# Patient Record
Sex: MALE | Race: WHITE | HISPANIC OR LATINO | Employment: FULL TIME | URBAN - METROPOLITAN AREA
[De-identification: names, ages, dates, MRNs, and addresses within clinical notes are randomized per-mention and may not be internally consistent; named-entity substitution may affect disease eponyms.]

---

## 2023-06-27 PROBLEM — R74.01 TRANSAMINITIS: Status: ACTIVE | Noted: 2023-06-27

## 2023-06-27 PROBLEM — K92.1 HEMATOCHEZIA: Status: ACTIVE | Noted: 2023-06-27

## 2023-06-27 PROBLEM — K52.9 COLITIS: Status: ACTIVE | Noted: 2023-06-27

## 2023-06-28 PROBLEM — D64.9 ANEMIA: Status: ACTIVE | Noted: 2023-06-28

## 2023-07-21 ENCOUNTER — HOSPITAL ENCOUNTER (EMERGENCY)
Facility: HOSPITAL | Age: 33
Discharge: HOME | End: 2023-07-21
Attending: EMERGENCY MEDICINE | Admitting: EMERGENCY MEDICINE
Payer: COMMERCIAL

## 2023-07-21 VITALS
SYSTOLIC BLOOD PRESSURE: 118 MMHG | RESPIRATION RATE: 16 BRPM | OXYGEN SATURATION: 96 % | HEIGHT: 66 IN | BODY MASS INDEX: 21.69 KG/M2 | DIASTOLIC BLOOD PRESSURE: 55 MMHG | HEART RATE: 83 BPM | WEIGHT: 135 LBS | TEMPERATURE: 98.7 F

## 2023-07-21 DIAGNOSIS — K52.9 INFLAMMATORY BOWEL DISEASE: ICD-10-CM

## 2023-07-21 DIAGNOSIS — K62.5 RECTAL BLEEDING: Primary | ICD-10-CM

## 2023-07-21 LAB
ABO GROUP BLD: NORMAL
ALBUMIN SERPL-MCNC: 4.2 G/DL (ref 3.5–5.2)
ALBUMIN/GLOB SERPL: 2 G/DL
ALP SERPL-CCNC: 71 U/L (ref 39–117)
ALT SERPL W P-5'-P-CCNC: 54 U/L (ref 1–41)
ANION GAP SERPL CALCULATED.3IONS-SCNC: 9 MMOL/L (ref 5–15)
AST SERPL-CCNC: 25 U/L (ref 1–40)
BILIRUB SERPL-MCNC: 0.4 MG/DL (ref 0–1.2)
BLD GP AB SCN SERPL QL: NEGATIVE
BUN SERPL-MCNC: 10 MG/DL (ref 6–20)
BUN/CREAT SERPL: 14.3 (ref 7–25)
CALCIUM SPEC-SCNC: 9.3 MG/DL (ref 8.6–10.5)
CHLORIDE SERPL-SCNC: 105 MMOL/L (ref 98–107)
CO2 SERPL-SCNC: 24 MMOL/L (ref 22–29)
CREAT SERPL-MCNC: 0.7 MG/DL (ref 0.76–1.27)
CRP SERPL-MCNC: <0.3 MG/DL (ref 0–0.5)
DEPRECATED RDW RBC AUTO: 49.4 FL (ref 37–54)
EGFRCR SERPLBLD CKD-EPI 2021: 126.3 ML/MIN/1.73
EOSINOPHIL # BLD MANUAL: 0.38 10*3/MM3 (ref 0–0.4)
EOSINOPHIL NFR BLD MANUAL: 7.3 % (ref 0.3–6.2)
ERYTHROCYTE [DISTWIDTH] IN BLOOD BY AUTOMATED COUNT: 14.6 % (ref 12.3–15.4)
ERYTHROCYTE [SEDIMENTATION RATE] IN BLOOD: 9 MM/HR (ref 0–15)
GLOBULIN UR ELPH-MCNC: 2.1 GM/DL
GLUCOSE SERPL-MCNC: 98 MG/DL (ref 65–99)
HCT VFR BLD AUTO: 38.2 % (ref 37.5–51)
HGB BLD-MCNC: 13 G/DL (ref 13–17.7)
LYMPHOCYTES # BLD MANUAL: 1.35 10*3/MM3 (ref 0.7–3.1)
LYMPHOCYTES NFR BLD MANUAL: 15.6 % (ref 5–12)
MCH RBC QN AUTO: 31.8 PG (ref 26.6–33)
MCHC RBC AUTO-ENTMCNC: 34 G/DL (ref 31.5–35.7)
MCV RBC AUTO: 93.4 FL (ref 79–97)
MONOCYTES # BLD: 0.81 10*3/MM3 (ref 0.1–0.9)
NEUTROPHILS # BLD AUTO: 2.65 10*3/MM3 (ref 1.7–7)
NEUTROPHILS NFR BLD MANUAL: 51 % (ref 42.7–76)
PLAT MORPH BLD: NORMAL
PLATELET # BLD AUTO: 198 10*3/MM3 (ref 140–450)
PMV BLD AUTO: 8.9 FL (ref 6–12)
POTASSIUM SERPL-SCNC: 4.2 MMOL/L (ref 3.5–5.2)
PROT SERPL-MCNC: 6.3 G/DL (ref 6–8.5)
RBC # BLD AUTO: 4.09 10*6/MM3 (ref 4.14–5.8)
RBC MORPH BLD: NORMAL
RH BLD: POSITIVE
SMUDGE CELLS BLD QL SMEAR: ABNORMAL
SODIUM SERPL-SCNC: 138 MMOL/L (ref 136–145)
T&S EXPIRATION DATE: NORMAL
VARIANT LYMPHS NFR BLD MANUAL: 26 % (ref 19.6–45.3)
WBC NRBC COR # BLD: 5.2 10*3/MM3 (ref 3.4–10.8)

## 2023-07-21 PROCEDURE — 85652 RBC SED RATE AUTOMATED: CPT | Performed by: EMERGENCY MEDICINE

## 2023-07-21 PROCEDURE — 86140 C-REACTIVE PROTEIN: CPT | Performed by: EMERGENCY MEDICINE

## 2023-07-21 PROCEDURE — 80053 COMPREHEN METABOLIC PANEL: CPT | Performed by: EMERGENCY MEDICINE

## 2023-07-21 PROCEDURE — 86900 BLOOD TYPING SEROLOGIC ABO: CPT | Performed by: EMERGENCY MEDICINE

## 2023-07-21 PROCEDURE — 85025 COMPLETE CBC W/AUTO DIFF WBC: CPT | Performed by: EMERGENCY MEDICINE

## 2023-07-21 PROCEDURE — 99283 EMERGENCY DEPT VISIT LOW MDM: CPT

## 2023-07-21 PROCEDURE — 86901 BLOOD TYPING SEROLOGIC RH(D): CPT | Performed by: EMERGENCY MEDICINE

## 2023-07-21 PROCEDURE — 86850 RBC ANTIBODY SCREEN: CPT | Performed by: EMERGENCY MEDICINE

## 2023-07-21 PROCEDURE — 85007 BL SMEAR W/DIFF WBC COUNT: CPT | Performed by: EMERGENCY MEDICINE

## 2023-07-21 RX ORDER — SODIUM CHLORIDE 0.9 % (FLUSH) 0.9 %
10 SYRINGE (ML) INJECTION AS NEEDED
Status: DISCONTINUED | OUTPATIENT
Start: 2023-07-21 | End: 2023-07-21 | Stop reason: HOSPADM

## 2023-07-21 RX ORDER — PREDNISONE 20 MG/1
20 TABLET ORAL DAILY
Qty: 30 TABLET | Refills: 0 | Status: SHIPPED | OUTPATIENT
Start: 2023-07-21 | End: 2023-07-27 | Stop reason: DRUGHIGH

## 2023-07-21 RX ORDER — MESALAMINE 4 G/60ML
4 ENEMA RECTAL NIGHTLY
Qty: 1800 ML | Refills: 0 | Status: SHIPPED | OUTPATIENT
Start: 2023-07-21 | End: 2023-08-20

## 2023-07-21 NOTE — ED PROVIDER NOTES
EMERGENCY DEPARTMENT ENCOUNTER    Room Number:  11/11  PCP: Provider, No Known  Patient Care Team:  Provider, No Known as PCP - General   Independent Historians: Patient, family    HPI:  Chief Complaint: Rectal bleeding    A complete HPI/ROS/PMH/PSH/SH/FH are unobtainable due to: Language barrier    Chronic or social conditions impacting patient care (Social Determinants of Health): Nothing  (Financial Resource Strain / Food Insecurity / Transportation Needs / Physical Activity / Stress / Social Connections / Intimate Partner Violence / Housing Stability)    Context: Magdy Guerrero is a 33 y.o. male who presents to the ED c/o acute rectal bleeding.  The patient reports that he was recently admitted to the hospital for rectal bleeding.  He states it started 2 days ago.  He states he is on prednisone.  He states that he was recently admitted to the hospital for colitis and was told that he had inflammatory bowel disease.  He states he has not followed up with the GI doctors as an outpatient.  He denies any pain.  He reports he only has blood in his stool with bowel movement.  He denies any chest pain or shortness of breath.  He denies any dizziness.    Review of prior external notes (non-ED) -and- Review of prior external test results outside of this encounter: Discharge summary dated 7/2/2023 noting inflammatory bowel disease with hematochezia.  He had upper and lower GI scopes.    Prescription drug monitoring program review:         PAST MEDICAL HISTORY  Active Ambulatory Problems     Diagnosis Date Noted    Colitis 06/27/2023    Hematochezia 06/27/2023    Transaminitis 06/27/2023    Anemia 06/28/2023     Resolved Ambulatory Problems     Diagnosis Date Noted    No Resolved Ambulatory Problems     No Additional Past Medical History         PAST SURGICAL HISTORY  Past Surgical History:   Procedure Laterality Date    COLONOSCOPY N/A 6/29/2023    Procedure: COLONOSCOPY TO CECUM/TI WITH COLD BIOPSIES THROUGHOUT;   Surgeon: Jovanni Carrera MD;  Location: [facility] ENDOSCOPY;  Service: Gastroenterology;  Laterality: N/A;  pre: HEMATOCHEZIA  post: PAN -COLITIS    ENDOSCOPY N/A 6/29/2023    Procedure: ESOPHAGOGASTRODUODENOSCOPY WITH BIOPSIES;  Surgeon: Jovanni Carrera MD;  Location: [facility] ENDOSCOPY;  Service: Gastroenterology;  Laterality: N/A;  pre: MELENA  post: MILD ESOPHAGITIS, GASTRITIS         FAMILY HISTORY  No family history on file.      SOCIAL HISTORY  Social History     Socioeconomic History    Marital status: Single   Tobacco Use    Smoking status: Former     Types: Cigarettes     Quit date: 2020     Years since quitting: 3.5    Smokeless tobacco: Never   Vaping Use    Vaping Use: Never used   Substance and Sexual Activity    Alcohol use: Never    Drug use: Yes     Types: Marijuana     Comment: once a day    Sexual activity: Defer         ALLERGIES  Patient has no known allergies.        REVIEW OF SYSTEMS  Review of Systems  Included in HPI  All systems reviewed and negative except for those discussed in HPI.      PHYSICAL EXAM    I have reviewed the triage vital signs and nursing notes.    ED Triage Vitals   Temp Heart Rate Resp BP SpO2   07/21/23 0935 07/21/23 0935 07/21/23 0935 07/21/23 0947 07/21/23 0935   98.7 °F (37.1 °C) 96 16 134/77 98 %      Temp src Heart Rate Source Patient Position BP Location FiO2 (%)   -- -- -- -- --              Physical Exam  GENERAL: Awake, alert, no acute distress, well-appearing  SKIN: Warm, dry  HENT: Normocephalic, atraumatic  EYES: no scleral icterus  CV: regular rhythm, regular rate  RESPIRATORY: normal effort, lungs clear  ABDOMEN: soft, nontender, nondistended  MUSCULOSKELETAL: no deformity  NEURO: alert, moves all extremities, follows commands                                                               LAB RESULTS  Recent Results (from the past 24 hour(s))   Sedimentation Rate    Collection Time: 07/21/23 10:02 AM    Specimen: Blood   Result Value Ref Range    Sed  Rate 9 0 - 15 mm/hr   C-reactive Protein    Collection Time: 07/21/23 10:02 AM    Specimen: Blood   Result Value Ref Range    C-Reactive Protein <0.30 0.00 - 0.50 mg/dL   Type & Screen    Collection Time: 07/21/23 10:02 AM    Specimen: Blood   Result Value Ref Range    ABO Type O     RH type Positive     Antibody Screen Negative     T&S Expiration Date 7/24/2023 11:59:59 PM    CBC Auto Differential    Collection Time: 07/21/23 10:02 AM    Specimen: Blood   Result Value Ref Range    WBC 5.20 3.40 - 10.80 10*3/mm3    RBC 4.09 (L) 4.14 - 5.80 10*6/mm3    Hemoglobin 13.0 13.0 - 17.7 g/dL    Hematocrit 38.2 37.5 - 51.0 %    MCV 93.4 79.0 - 97.0 fL    MCH 31.8 26.6 - 33.0 pg    MCHC 34.0 31.5 - 35.7 g/dL    RDW 14.6 12.3 - 15.4 %    RDW-SD 49.4 37.0 - 54.0 fl    MPV 8.9 6.0 - 12.0 fL    Platelets 198 140 - 450 10*3/mm3   Manual Differential    Collection Time: 07/21/23 10:02 AM    Specimen: Blood   Result Value Ref Range    Neutrophil % 51.0 42.7 - 76.0 %    Lymphocyte % 26.0 19.6 - 45.3 %    Monocyte % 15.6 (H) 5.0 - 12.0 %    Eosinophil % 7.3 (H) 0.3 - 6.2 %    Neutrophils Absolute 2.65 1.70 - 7.00 10*3/mm3    Lymphocytes Absolute 1.35 0.70 - 3.10 10*3/mm3    Monocytes Absolute 0.81 0.10 - 0.90 10*3/mm3    Eosinophils Absolute 0.38 0.00 - 0.40 10*3/mm3    RBC Morphology Normal Normal    Smudge Cells Slight/1+ None Seen    Platelet Morphology Normal Normal   Comprehensive Metabolic Panel    Collection Time: 07/21/23 10:50 AM    Specimen: Blood   Result Value Ref Range    Glucose 98 65 - 99 mg/dL    BUN 10 6 - 20 mg/dL    Creatinine 0.70 (L) 0.76 - 1.27 mg/dL    Sodium 138 136 - 145 mmol/L    Potassium 4.2 3.5 - 5.2 mmol/L    Chloride 105 98 - 107 mmol/L    CO2 24.0 22.0 - 29.0 mmol/L    Calcium 9.3 8.6 - 10.5 mg/dL    Total Protein 6.3 6.0 - 8.5 g/dL    Albumin 4.2 3.5 - 5.2 g/dL    ALT (SGPT) 54 (H) 1 - 41 U/L    AST (SGOT) 25 1 - 40 U/L    Alkaline Phosphatase 71 39 - 117 U/L    Total Bilirubin 0.4 0.0 - 1.2 mg/dL     Globulin 2.1 gm/dL    A/G Ratio 2.0 g/dL    BUN/Creatinine Ratio 14.3 7.0 - 25.0    Anion Gap 9.0 5.0 - 15.0 mmol/L    eGFR 126.3 >60.0 mL/min/1.73       Ordered the above labs and independently reviewed the results.        RADIOLOGY  No Radiology Exams Resulted Within Past 24 Hours    I ordered the above noted radiological studies. Reviewed by me and discussed with radiologist.  See dictation for official radiology interpretation.      PROCEDURES    Procedures      MEDICATIONS GIVEN IN ER    Medications   sodium chloride 0.9 % flush 10 mL (has no administration in time range)         ORDERS PLACED DURING THIS VISIT:  Orders Placed This Encounter   Procedures    Comprehensive Metabolic Panel    Sedimentation Rate    C-reactive Protein    CBC Auto Differential    Manual Differential    Ambulatory Referral to [facility]    Monitor Blood Pressure    Pulse Oximetry, Continuous    Gastroenterology (on-call MD unless specified)    Type & Screen    Insert Peripheral IV    CBC & Differential         PROGRESS, DATA ANALYSIS, CONSULTS, AND MEDICAL DECISION MAKING    All labs have been independently interpreted by me.  All radiology studies have been reviewed by me and discussed with radiologist dictating the report.   EKG's independently viewed and interpreted by me.  Discussion below represents my analysis of pertinent findings related to patient's condition, differential diagnosis, treatment plan and final disposition.    Differential diagnosis includes but is not limited to upper GI bleed, lower GI bleed, infectious colitis, inflammatory colitis.    ED Course as of 07/21/23 1223   Fri Jul 21, 2023   1132 I reviewed work-up and findings with the patient and family at bedside.  Answered all questions.  The patient's hemoglobin is stable.  Rectal exam shows empty vault with no gross blood.  The residue is heme positive.  I have a call out to his GI specialist to discuss.  Of note, he is supposed to follow-up with his GI  specialist 2 weeks after discharge from the hospital but he has no pending appointments in our system. [TR]   1136 I did confirm with the patient and family.  He is currently on 40 mg of prednisone daily.  His dose has not changed. [TR]   1201 Discussing with Dr. Bonilla with gastroenterology.  He requests the patient be prescribed mesalamine enemas nightly.  He was to continue the steroids.  He will send a message to ensure that the patient has follow-up.  He states that the biopsies are consistent with inflammatory bowel disease. [TR]   1222 I reviewed the work-up and findings with the patient and family at the bedside.  Answered all questions.  Plan discharge home.  They do note that the patient only has about a week left of his prednisone.  I will prescribe more to get him coverage until he can see the GI specialist. [TR]      ED Course User Index  [TR] Georgi Walls MD           AS OF 12:23 EDT VITALS:    BP - 118/55  HR - 83  TEMP - 98.7 °F (37.1 °C)  O2 SATS - 96%        DIAGNOSIS  Final diagnoses:   Rectal bleeding   Inflammatory bowel disease         DISPOSITION  ED Disposition       ED Disposition   Discharge    Condition   Stable    Comment   --                  Note Disclaimer: At [facility], we believe that sharing information builds trust and better relationships. You are receiving this note because you recently visited [facility]. It is possible you will see health information before a provider has talked with you about it. This kind of information can be easy to misunderstand. To help you fully understand what it means for your health, we urge you to discuss this note with your provider.         Georgi Walls MD  07/21/23 1212       Georgi Walls MD  07/21/23 1223

## 2023-07-21 NOTE — DISCHARGE INSTRUCTIONS
Use the enema nightly.  Continue to take your steroids.  Return here for fever over 100.4, uncontrolled nausea or vomiting, or uncontrolled bleeding.  The primary care doctor office will call you to schedule an appointment to be seen.  Follow-up with Dr. Carrera.  You will need to call to make an appointment.  He is the bowel specialist.

## 2023-07-27 ENCOUNTER — TELEPHONE (OUTPATIENT)
Dept: GASTROENTEROLOGY | Facility: CLINIC | Age: 33
End: 2023-07-27
Payer: COMMERCIAL

## 2023-07-27 ENCOUNTER — OFFICE VISIT (OUTPATIENT)
Dept: GASTROENTEROLOGY | Facility: CLINIC | Age: 33
End: 2023-07-27
Payer: COMMERCIAL

## 2023-07-27 VITALS
TEMPERATURE: 98.4 F | WEIGHT: 140.4 LBS | HEIGHT: 66 IN | DIASTOLIC BLOOD PRESSURE: 81 MMHG | HEART RATE: 77 BPM | OXYGEN SATURATION: 97 % | BODY MASS INDEX: 22.56 KG/M2 | SYSTOLIC BLOOD PRESSURE: 135 MMHG

## 2023-07-27 DIAGNOSIS — K92.1 HEMATOCHEZIA: ICD-10-CM

## 2023-07-27 DIAGNOSIS — K51.011 ULCERATIVE PANCOLITIS WITH RECTAL BLEEDING: Primary | ICD-10-CM

## 2023-07-27 RX ORDER — PREDNISONE 10 MG/1
TABLET ORAL
Qty: 98 TABLET | Refills: 0 | Status: SHIPPED | OUTPATIENT
Start: 2023-07-27 | End: 2023-08-31

## 2023-07-27 NOTE — TELEPHONE ENCOUNTER
----- Message from Jovanni VALLADARES MD sent at 7/24/2023  1:20 PM EDT -----  Regarding: IBD serology testing  Okay to notify patient IBD serology test was positive, he needs office follow-up to discuss options for treatment.  ----- Message -----  From: Lab, Background User  Sent: 7/20/2023   4:36 AM EDT  To: Jovanni VALLADARES MD

## 2023-07-27 NOTE — PROGRESS NOTES
"Chief Complaint  Black or Bloody Stool and Abdominal Pain    Subjective          History of Present Illness    Magdy Guerrero is a  33 y.o. male presents for hospital follow-up on new diagnosis of ulcerative colitis.  He will follow with myself and Dr. Carrera.    He was admitted to the hospital with acute rectal bleeding and found to have ulcerative pancolitis on colonoscopy.  He was started on prednisone and nightly mesalamine enemas.  Failed oral mesalamine while at the hospital.  IBD serology test was positive. 7/21 labs showed normalization of CBC with hemoglobin 13, CRP and sed rate also normal.  6/27 fecal calprotectin elevated at 1500.  He is down to prednisone 20mg QD.  Mesalamine enemas are not helping. Still with rectal bleeding, 5-6 Bms/day, nocturnal. No abdominal pain.  Denies weight loss, lightheadedness, shortness of air, fever.    He also showed elevated transaminases while hospitalized.  7/21/2023 labs showed improvement with ALT 54, all others normal.  Acute hepatitis panel unremarkable.    6/29/2023 EGD showed irregular Z-line, mild gastritis.  Pathology with normal duodenum, chronic inactive gastritis, negative H. pylori, reflux esophagitis, negative metaplasia.    Family in room interpreted for patient.  They declined phone/video .    Objective   Vital Signs:   /81   Pulse 77   Temp 98.4 °F (36.9 °C)   Ht 167.6 cm (66\")   Wt 63.7 kg (140 lb 6.4 oz)   SpO2 97%   BMI 22.66 kg/m²       Physical Exam  Vitals reviewed.   Constitutional:       General: He is awake. He is not in acute distress.     Appearance: Normal appearance. He is well-developed and well-groomed.   HENT:      Head: Normocephalic.   Pulmonary:      Effort: Pulmonary effort is normal. No respiratory distress.   Skin:     Coloration: Skin is not pale.   Neurological:      Mental Status: He is alert and oriented to person, place, and time.      Gait: Gait is intact.   Psychiatric:         Mood and Affect: " Mood and affect normal.         Speech: Speech normal.         Behavior: Behavior is cooperative.         Judgment: Judgment normal.        Result Review :             Assessment and Plan    Diagnoses and all orders for this visit:    1. Ulcerative pancolitis with rectal bleeding (Primary)  -     QuantiFERON TB Gold  -     Hepatitis B Surface Antibody  -     Hepatitis A Antibody, Total  -     CBC (No Diff)    2. Hematochezia  -     QuantiFERON TB Gold  -     Hepatitis B Surface Antibody  -     Hepatitis A Antibody, Total  -     CBC (No Diff)    Other orders  -     predniSONE (DELTASONE) 10 MG tablet; Take 4 tablets by mouth Daily for 14 days, THEN 3 tablets Daily for 7 days, THEN 2 tablets Daily for 7 days, THEN 1 tablet Daily for 7 days.  Dispense: 98 tablet; Refill: 0    We discussed his nonresponse to oral and rectal mesalamine's.  He continues on 40 mg of prednisone and still has symptoms despite this.  Recommend proceeding with biologic agent.  We discussed his different options and recommend proceeding with Stelara.  He is agreeable.  We will check labs above.    He will stay on prednisone 40 mg for now and we will plan to taper in a couple weeks.  His hemoglobin has been stable despite continued rectal bleeding.  We did discuss if he were to develop worsening rectal bleeding, shortness of air, lightheadedness, fever, or abdominal pain he should go right to the ER.    We did discuss that immunosuppressive therapy puts the patient at higher risk for infection; patient is aware and will take appropriate precautions. I did  on staying updated on vaccines, including annual flu shot, shingles shot, pneumonia. The patient is also aware of the rare but increased risk of malignancy, such as lymphoma and skin cancer, with biologic or immunosuppressive therapy as well. I also counseled the patient to follow with a dermatologist annually and sunscreen use.     Follow Up   Return in about 2 months (around  9/27/2023).    Dragon dictation used throughout this note.     January Madison PA-C

## 2023-07-28 LAB
ERYTHROCYTE [DISTWIDTH] IN BLOOD BY AUTOMATED COUNT: 14.4 % (ref 11.6–15.4)
HAV AB SER QL IA: POSITIVE
HBV SURFACE AB SER QL: REACTIVE
HCT VFR BLD AUTO: 40.2 % (ref 37.5–51)
HGB BLD-MCNC: 13.4 G/DL (ref 13–17.7)
MCH RBC QN AUTO: 31.3 PG (ref 26.6–33)
MCHC RBC AUTO-ENTMCNC: 33.3 G/DL (ref 31.5–35.7)
MCV RBC AUTO: 94 FL (ref 79–97)
PLATELET # BLD AUTO: 247 X10E3/UL (ref 150–450)
RBC # BLD AUTO: 4.28 X10E6/UL (ref 4.14–5.8)
WBC # BLD AUTO: 8.1 X10E3/UL (ref 3.4–10.8)

## 2023-07-29 LAB
GAMMA INTERFERON BACKGROUND BLD IA-ACNC: 0.08 IU/ML
M TB IFN-G BLD-IMP: NEGATIVE
M TB IFN-G CD4+ T-CELLS BLD-ACNC: 0.07 IU/ML
M TBIFN-G CD4+ CD8+T-CELLS BLD-ACNC: 0.08 IU/ML
MITOGEN IGNF BLD-ACNC: >10 IU/ML
QUANTIFERON INCUBATION: NORMAL
SERVICE CMNT-IMP: NORMAL

## 2023-08-03 ENCOUNTER — TELEPHONE (OUTPATIENT)
Dept: GASTROENTEROLOGY | Facility: CLINIC | Age: 33
End: 2023-08-03
Payer: COMMERCIAL

## 2023-08-17 ENCOUNTER — SPECIALTY PHARMACY (OUTPATIENT)
Dept: GASTROENTEROLOGY | Facility: CLINIC | Age: 33
End: 2023-08-17
Payer: COMMERCIAL

## 2023-08-18 ENCOUNTER — OFFICE VISIT (OUTPATIENT)
Dept: FAMILY MEDICINE CLINIC | Facility: CLINIC | Age: 33
End: 2023-08-18
Payer: COMMERCIAL

## 2023-08-18 VITALS
HEIGHT: 66 IN | BODY MASS INDEX: 21.21 KG/M2 | HEART RATE: 95 BPM | RESPIRATION RATE: 16 BRPM | OXYGEN SATURATION: 98 % | WEIGHT: 132 LBS | DIASTOLIC BLOOD PRESSURE: 72 MMHG | SYSTOLIC BLOOD PRESSURE: 118 MMHG

## 2023-08-18 DIAGNOSIS — Z13.220 SCREENING CHOLESTEROL LEVEL: ICD-10-CM

## 2023-08-18 DIAGNOSIS — K51.011 ULCERATIVE PANCOLITIS WITH RECTAL BLEEDING: Primary | ICD-10-CM

## 2023-08-18 DIAGNOSIS — D64.9 ANEMIA, UNSPECIFIED TYPE: ICD-10-CM

## 2023-08-18 DIAGNOSIS — K92.1 HEMATOCHEZIA: ICD-10-CM

## 2023-08-18 DIAGNOSIS — R74.01 TRANSAMINITIS: ICD-10-CM

## 2023-08-18 NOTE — PROGRESS NOTES
Chief Complaint  hosptal follow up  (Establish care and hospital follow up)    Subjective          Magdy Guerrero presents to [facility] for  History of Present Illness  He is, new to me, here to follow-up on [facility] [place] ED visit and office visit with GI specialist, RANDY Sim, relating to GI bleed with diagnosis of Colitis.    Since hospitalization he has been having normal bowel movements no bleeding, no diarrhea.  Today, he reports eating regular diet with barbeque, beef, white rice, fish and his normal diet.  He understands to not eat fiber but unaware what foods have fiber in it.  Today, he denies SOB, CP, lightheadedness, vomiting fever chills, constipation.  He only feels central abdominal pain when he has urge to have BM and then abdominal pain will go away after BM.  He is complaining about continue blood in stool.  He is still taking Prednisone 40mg and Omepprazole 40mg daily which helps, but after 4-5 hours he continues with watery bloody stools and having about 4-5 BM's daily.  When he has bowel movements, he will sometimes have alternating liquid, paste, increased blood in stool and sometimes no blood in stool.    He went to GI specialist and agreed to take Stelera.  Patient is here today indicating he does not want to take Stelera due to his risk of infection.  He reports having to work and be around his kids and is afraid he will stay sick all the time because of medication.     He is here with his partner, Velma Santiago, who is also translating for him.    Review of Systems   Constitutional:  Negative for chills and fever.   Respiratory:  Negative for shortness of breath.    Cardiovascular:  Negative for chest pain and palpitations.   Gastrointestinal:  Positive for abdominal pain, blood in stool and diarrhea. Negative for abdominal distention, anal bleeding, constipation, nausea, rectal pain and vomiting.        Currently sometimes stool is light pink and  "sometimes stool soft with a little blood on top of stool.  Has central abdominal pain when has urge to have BM, after BM pain goes away.   Neurological:  Negative for dizziness and light-headedness.       Objective   Vital Signs:   /72 (BP Location: Left arm, Patient Position: Sitting, Cuff Size: Adult)   Pulse 95   Resp 16   Ht 167.6 cm (65.98\")   Wt 59.9 kg (132 lb)   SpO2 98%   BMI 21.32 kg/m²     Physical Exam  Vitals and nursing note reviewed.   Constitutional:       General: He is not in acute distress.     Appearance: Normal appearance.   HENT:      Head: Normocephalic and atraumatic.   Eyes:      Conjunctiva/sclera: Conjunctivae normal.   Cardiovascular:      Rate and Rhythm: Normal rate and regular rhythm.      Heart sounds: Normal heart sounds. No murmur heard.  Pulmonary:      Effort: Pulmonary effort is normal. No respiratory distress.      Breath sounds: Normal breath sounds. No wheezing.   Abdominal:      General: Bowel sounds are normal. There is no distension.      Palpations: Abdomen is soft. There is no mass.      Tenderness: There is no abdominal tenderness. There is no guarding or rebound.   Skin:     General: Skin is warm and dry.      Coloration: Skin is pale.      Findings: No erythema.   Neurological:      General: No focal deficit present.      Mental Status: He is alert.   Psychiatric:         Mood and Affect: Mood normal.      Result Review :   The following data was reviewed by: MAYITO Herring on 08/18/2023:  CMP          7/2/2023    06:17 7/21/2023    10:50 8/18/2023    10:55   CMP   Glucose 147  98  95    BUN 7  10  8    Creatinine 0.71  0.70  0.85    EGFR 125.8  126.3     Sodium 141  138  137    Potassium 4.6  4.2  4.0    Chloride 106  105  98    Calcium 9.4  9.3  9.3    Total Protein   6.2    Total Protein 5.9  6.3     Albumin 3.7  4.2  3.7    Globulin   2.5    Globulin 2.2  2.1     Total Bilirubin 0.2  0.4  <0.2    Alkaline Phosphatase 71  71  67    AST (SGOT) 44  " 25  10    ALT (SGPT) 107  54  13    Albumin/Globulin Ratio 1.7  2.0     BUN/Creatinine Ratio 9.9  14.3  9.4    Anion Gap 9.0  9.0       CBC w/diff          7/21/2023    10:02 7/27/2023    11:18 8/18/2023    10:55   CBC w/Diff   WBC 5.20  8.1  9.54    RBC 4.09  4.28  4.03    Hemoglobin 13.0  13.4  12.0    Hematocrit 38.2  40.2  35.5    MCV 93.4  94  88.1    MCH 31.8  31.3  29.8    MCHC 34.0  33.3  33.8    RDW 14.6  14.4  13.2    Platelets 198  247  393      Data reviewed : Recent hospitalization notes [facility] ED visit 7/21/23 and GI Office Note 7/27/23.            Assessment and Plan    Diagnoses and all orders for this visit:    1. Ulcerative pancolitis with rectal bleeding (Primary)  Assessment & Plan:  Continue to have intermittent blood in stool.  Provided patient with basic information and dietary recommendations relating to ulcerative colitis and encouraged dietary changes.  Encouraged patient to continue to f/u with GI and to discuss their concerns about taking Stelera with GI specialist and they agreed.   Labs:  CBC, CMP  Following by GI specialist.    Orders:  -     CBC & Differential  -     Comprehensive Metabolic Panel    2. Transaminitis  Assessment & Plan:  Liver Enzymes improving on labs from 7/21/23.  Followed by GI specialist, RANDY Sim.      3. Hematochezia  Assessment & Plan:  Continues to have intermittent blood in stool.  Followed by GI specialist, RANDY Sim.      4. Anemia, unspecified type  Assessment & Plan:  Lab:  CBC  Will call with result and plan.      5. Screening cholesterol level  Comments:  Lab:  Lipid Panel  Orders:  -     Lipid Panel    Other orders  -     Manual Differential        Follow Up   Return in about 2 months (around 10/18/2023) for Next scheduled follow up Colitis.  Patient was given instructions and counseling regarding his condition or for health maintenance advice. Please see specific information pulled into the AVS if appropriate.

## 2023-08-21 LAB
ALBUMIN SERPL-MCNC: 3.7 G/DL (ref 3.5–5.2)
ALBUMIN/GLOB SERPL: 1.5 G/DL
ALP SERPL-CCNC: 67 U/L (ref 39–117)
ALT SERPL-CCNC: 13 U/L (ref 1–41)
AST SERPL-CCNC: 10 U/L (ref 1–40)
BASOPHILS # BLD MANUAL: 0.1 10*3/MM3 (ref 0–0.2)
BASOPHILS NFR BLD MANUAL: 1 % (ref 0–1.5)
BILIRUB SERPL-MCNC: <0.2 MG/DL (ref 0–1.2)
BUN SERPL-MCNC: 8 MG/DL (ref 6–20)
BUN/CREAT SERPL: 9.4 (ref 7–25)
CALCIUM SERPL-MCNC: 9.3 MG/DL (ref 8.6–10.5)
CHLORIDE SERPL-SCNC: 98 MMOL/L (ref 98–107)
CHOLEST SERPL-MCNC: 153 MG/DL (ref 0–200)
CO2 SERPL-SCNC: 27.6 MMOL/L (ref 22–29)
CREAT SERPL-MCNC: 0.85 MG/DL (ref 0.76–1.27)
DIFFERENTIAL COMMENT: ABNORMAL
EGFRCR SERPLBLD CKD-EPI 2021: 119.1 ML/MIN/1.73
EOSINOPHIL # BLD MANUAL: 0.1 10*3/MM3 (ref 0–0.4)
EOSINOPHIL NFR BLD MANUAL: 1 % (ref 0.3–6.2)
ERYTHROCYTE [DISTWIDTH] IN BLOOD BY AUTOMATED COUNT: 13.2 % (ref 12.3–15.4)
GLOBULIN SER CALC-MCNC: 2.5 GM/DL
GLUCOSE SERPL-MCNC: 95 MG/DL (ref 65–99)
HCT VFR BLD AUTO: 35.5 % (ref 37.5–51)
HDLC SERPL-MCNC: 57 MG/DL (ref 40–60)
HGB BLD-MCNC: 12 G/DL (ref 13–17.7)
LDLC SERPL CALC-MCNC: 76 MG/DL (ref 0–100)
LYMPHOCYTES # BLD MANUAL: 0.95 10*3/MM3 (ref 0.7–3.1)
LYMPHOCYTES NFR BLD MANUAL: 10 % (ref 19.6–45.3)
MCH RBC QN AUTO: 29.8 PG (ref 26.6–33)
MCHC RBC AUTO-ENTMCNC: 33.8 G/DL (ref 31.5–35.7)
MCV RBC AUTO: 88.1 FL (ref 79–97)
MONOCYTES # BLD MANUAL: 0.19 10*3/MM3 (ref 0.1–0.9)
MONOCYTES NFR BLD MANUAL: 2 % (ref 5–12)
NEUTROPHILS # BLD MANUAL: 8.2 10*3/MM3 (ref 1.7–7)
NEUTROPHILS NFR BLD MANUAL: 86 % (ref 42.7–76)
NRBC BLD AUTO-RTO: 0 /100 WBC (ref 0–0.2)
PLATELET # BLD AUTO: 393 10*3/MM3 (ref 140–450)
PLATELET BLD QL SMEAR: ABNORMAL
POTASSIUM SERPL-SCNC: 4 MMOL/L (ref 3.5–5.2)
PROT SERPL-MCNC: 6.2 G/DL (ref 6–8.5)
RBC # BLD AUTO: 4.03 10*6/MM3 (ref 4.14–5.8)
RBC MORPH BLD: ABNORMAL
SODIUM SERPL-SCNC: 137 MMOL/L (ref 136–145)
TRIGL SERPL-MCNC: 113 MG/DL (ref 0–150)
VLDLC SERPL CALC-MCNC: 20 MG/DL (ref 5–40)
WBC # BLD AUTO: 9.54 10*3/MM3 (ref 3.4–10.8)

## 2023-08-24 ENCOUNTER — PATIENT ROUNDING (BHMG ONLY) (OUTPATIENT)
Dept: FAMILY MEDICINE CLINIC | Facility: CLINIC | Age: 33
End: 2023-08-24
Payer: COMMERCIAL

## 2023-08-24 NOTE — PROGRESS NOTES
A [email or web address] message has been sent to patient rounding with [facility].

## 2023-09-10 PROBLEM — K51.011 ULCERATIVE PANCOLITIS WITH RECTAL BLEEDING: Status: ACTIVE | Noted: 2023-09-10

## 2023-09-10 NOTE — ASSESSMENT & PLAN NOTE
Continue to have intermittent blood in stool.  Provided patient with basic information and dietary recommendations relating to ulcerative colitis and encouraged dietary changes.  Encouraged patient to continue to f/u with GI and to discuss their concerns about taking Stelera with GI specialist and they agreed.   Labs:  CBC, CMP  Following by GI specialist.

## 2023-09-14 ENCOUNTER — OFFICE VISIT (OUTPATIENT)
Dept: GASTROENTEROLOGY | Facility: CLINIC | Age: 33
End: 2023-09-14
Payer: COMMERCIAL

## 2023-09-14 VITALS
DIASTOLIC BLOOD PRESSURE: 73 MMHG | TEMPERATURE: 97.8 F | SYSTOLIC BLOOD PRESSURE: 113 MMHG | HEART RATE: 103 BPM | WEIGHT: 123 LBS | BODY MASS INDEX: 19.77 KG/M2 | OXYGEN SATURATION: 96 % | HEIGHT: 66 IN

## 2023-09-14 DIAGNOSIS — K51.011 ULCERATIVE PANCOLITIS WITH RECTAL BLEEDING: Primary | ICD-10-CM

## 2023-09-14 PROCEDURE — 99214 OFFICE O/P EST MOD 30 MIN: CPT | Performed by: INTERNAL MEDICINE

## 2023-09-14 NOTE — PROGRESS NOTES
Chief Complaint   Patient presents with    Ulcerative pancolitis with rectal bleeding    Weight Loss        Magdy Guerrero is a  33 y.o. male here for a follow up visit for review of his current status with the ulcerative pancolitis as well as treatment options    HPI this 33-year-old male returns since last seen in July to discuss treatment options for his ulcerative pancolitis.  He had been considered a possible candidate for Stelara but his insurance company declined to cover him for infusions.  All of this information currently is being obtained via an .  We discussed his current status and he continues to have some degree of diarrhea although not as frequent as before.  He also has some abdominal pain and has noted continued weight loss with current weight of 123 pounds which is 9 pounds less than he weighed in August.  He also describes generalized weakness.  He had lab work done including a CBC and hepatitis panel as well as TB QuantiFERON gold testing all of which was negative.  With the discussion by the pharmacist he seems to be a good candidate for Simponi and will obtain this via the drug company to defer cost.  He will follow-up in this office in 4 to 6 weeks after he has received his initial dose and follow-up dose.    History reviewed. No pertinent past medical history.    Current Outpatient Medications   Medication Sig Dispense Refill    omeprazole (priLOSEC) 40 MG capsule Take 1 capsule by mouth Daily.       No current facility-administered medications for this visit.       PRN Meds:.    No Known Allergies    Social History     Socioeconomic History    Marital status: Single   Tobacco Use    Smoking status: Former     Packs/day: 0.50     Years: 1.00     Pack years: 0.50     Types: Cigarettes     Quit date: 2020     Years since quitting: 3.7     Passive exposure: Past    Smokeless tobacco: Never   Vaping Use    Vaping Use: Never used   Substance and Sexual Activity    Alcohol use: Never     Drug use: Yes     Types: Marijuana     Comment: once a day    Sexual activity: Defer       History reviewed. No pertinent family history.    Review of Systems   Constitutional:  Positive for fatigue and unexpected weight change. Negative for activity change and appetite change.   HENT:  Negative for congestion, facial swelling, sore throat, trouble swallowing and voice change.    Eyes:  Negative for photophobia and visual disturbance.   Respiratory:  Negative for cough and choking.    Cardiovascular:  Negative for chest pain.   Gastrointestinal:  Positive for abdominal pain, blood in stool and diarrhea. Negative for abdominal distention, anal bleeding, constipation, nausea, rectal pain and vomiting.   Endocrine: Negative for polyphagia.   Musculoskeletal:  Negative for arthralgias, gait problem and joint swelling.   Skin:  Negative for color change, pallor and rash.   Allergic/Immunologic: Negative for food allergies.   Neurological:  Negative for speech difficulty and headaches.   Hematological:  Does not bruise/bleed easily.   Psychiatric/Behavioral:  Negative for agitation, confusion and sleep disturbance.      Vitals:    09/14/23 1403   BP: 113/73   Pulse: 103   Temp: 97.8 °F (36.6 °C)   SpO2: 96%       Physical Exam  Constitutional:       Appearance: He is well-developed.   HENT:      Head: Normocephalic.   Eyes:      Conjunctiva/sclera: Conjunctivae normal.   Cardiovascular:      Rate and Rhythm: Normal rate and regular rhythm.   Pulmonary:      Breath sounds: Normal breath sounds.   Abdominal:      General: Bowel sounds are normal.      Palpations: Abdomen is soft.   Musculoskeletal:         General: Normal range of motion.      Cervical back: Normal range of motion.   Skin:     General: Skin is warm and dry.   Neurological:      Mental Status: He is alert and oriented to person, place, and time.   Psychiatric:         Behavior: Behavior normal.       ASSESSMENT   #1 ulcerative pancolitis: Decision has  been made to initiate biologic agent in the form of Simponi      PLAN  Patient will sign up for medical assistance to obtain his biologic agent  Once treatment initiated he will follow-up in this office in 4 to 6 weeks time to further assess and update  He has been advised to monitor for any exposure to infection by family or others.    No diagnosis found.

## 2023-09-22 ENCOUNTER — TELEPHONE (OUTPATIENT)
Dept: GASTROENTEROLOGY | Facility: CLINIC | Age: 33
End: 2023-09-22

## 2023-09-29 ENCOUNTER — TELEPHONE (OUTPATIENT)
Dept: GASTROENTEROLOGY | Facility: CLINIC | Age: 33
End: 2023-09-29
Payer: COMMERCIAL

## 2023-09-29 NOTE — TELEPHONE ENCOUNTER
----- Message from Magdy Guerrero sent at 9/29/2023  8:33 AM EDT -----  Regarding: treatment information   Contact: [phone]  hi how doing i want to know about my treatment because i don’t receive any call or information and i’m really need the treatment thanks

## 2023-10-02 NOTE — TELEPHONE ENCOUNTER
LVM with Lalitha Lindsay ("[employer]" field ) to check status of patient's application

## 2023-10-06 ENCOUNTER — TELEPHONE (OUTPATIENT)
Dept: FAMILY MEDICINE CLINIC | Facility: CLINIC | Age: 33
End: 2023-10-06
Payer: COMMERCIAL

## 2023-10-19 ENCOUNTER — OFFICE VISIT (OUTPATIENT)
Dept: GASTROENTEROLOGY | Facility: CLINIC | Age: 33
End: 2023-10-19
Payer: COMMERCIAL

## 2023-10-19 VITALS
WEIGHT: 117.1 LBS | HEIGHT: 66 IN | BODY MASS INDEX: 18.82 KG/M2 | SYSTOLIC BLOOD PRESSURE: 114 MMHG | OXYGEN SATURATION: 98 % | HEART RATE: 93 BPM | DIASTOLIC BLOOD PRESSURE: 73 MMHG

## 2023-10-19 DIAGNOSIS — K51.011 ULCERATIVE PANCOLITIS WITH RECTAL BLEEDING: Primary | ICD-10-CM

## 2023-10-19 DIAGNOSIS — D50.0 ANEMIA, BLOOD LOSS: ICD-10-CM

## 2023-10-19 RX ORDER — OMEPRAZOLE 40 MG/1
40 CAPSULE, DELAYED RELEASE ORAL DAILY
Qty: 90 CAPSULE | Refills: 1 | Status: SHIPPED | OUTPATIENT
Start: 2023-10-19

## 2023-10-19 RX ORDER — PREDNISONE 10 MG/1
TABLET ORAL
Qty: 98 TABLET | Refills: 0 | Status: SHIPPED | OUTPATIENT
Start: 2023-10-19 | End: 2023-11-02 | Stop reason: SDUPTHER

## 2023-10-19 NOTE — Clinical Note
Could you please call patient and advise him to start tapering his prednisone around 11/14.  He should go down to 3/day for 7 days then 2/day for 7 days then 1/day for 7 days then come off.  He should be starting his Humira today or tomorrow.

## 2023-10-19 NOTE — PROGRESS NOTES
"Chief Complaint  Ulcerative pancolitis     Subjective          History of Present Illness    Magdy Guerrero is a  33 y.o. [place] speaking male presents for ulcerative pancolitis.  He is a patient of Dr. Carrera last seen on 9/14/2023.    He is not currently on any medication for his ulcerative colitis.  He ran out of the prednisone after a taper.  We have not been able to get him approved for Stelara due to insurance reasons.  Currently working on Simponi.  Today he reports some blood in stool, abdominal discomfort, GONZALEZ, fatigue, and 8-9 bowel movements per day.  Not as bad as when he was in the hospital but still not doing well.  He is pale today.  He has been unable to keep a job due to his symptoms.  He has lost a significant amount of weight and is down to 118 pounds with a BMI of 19.    8/18/23 labs showed normal CMP, CBC with hemoglobin mildly low at 12, normocytic.    Objective   Vital Signs:   /73   Pulse 93   Ht 167.6 cm (66\")   Wt 53.1 kg (117 lb 1.6 oz)   SpO2 98%   BMI 18.90 kg/m²       Physical Exam  Vitals reviewed.   Constitutional:       General: He is awake. He is not in acute distress.     Appearance: Normal appearance. He is well-developed and well-groomed.   HENT:      Head: Normocephalic.   Pulmonary:      Effort: Pulmonary effort is normal. No respiratory distress.   Skin:     General: Skin is warm.      Coloration: Skin is pale.   Neurological:      Mental Status: He is alert and oriented to person, place, and time.      Gait: Gait is intact.   Psychiatric:         Mood and Affect: Mood and affect normal.         Speech: Speech normal.         Behavior: Behavior is cooperative.         Judgment: Judgment normal.          Result Review :             Assessment and Plan    Diagnoses and all orders for this visit:    1. Ulcerative pancolitis with rectal bleeding (Primary)  -     Comprehensive Metabolic Panel  -     C-reactive Protein  -     Sedimentation Rate  -     CBC & " Differential  -     Iron Profile  -     Ferritin  -     Vitamin B12  -     Folate    2. Anemia, blood loss  -     Comprehensive Metabolic Panel  -     C-reactive Protein  -     Sedimentation Rate  -     CBC & Differential  -     Iron Profile  -     Ferritin  -     Vitamin B12  -     Folate    Other orders  -     predniSONE (DELTASONE) 10 MG tablet; Take 4 tablets by mouth Daily for 14 days, THEN 3 tablets Daily for 7 days, THEN 2 tablets Daily for 7 days, THEN 1 tablet Daily for 7 days.  Dispense: 98 tablet; Refill: 0  -     omeprazole (priLOSEC) 40 MG capsule; Take 1 capsule by mouth Daily.  Dispense: 90 capsule; Refill: 1    Discussed with pharmacist and we should hear about Simponi tomorrow.  If not approved, will proceed with Humira.  Simponi is a better drug than Humira for ulcerative colitis patients.    He has lost a decent amount of weight and is very pale today.  Recommend he restart Prednisone, continue omeprazole.  We will check labs as above today.    Recommended he also start nutrition supplement.     Follow Up   Return in about 2 months (around 12/19/2023).    Dragon dictation used throughout this note.     January Madison PA-C

## 2023-10-20 ENCOUNTER — TELEPHONE (OUTPATIENT)
Dept: GASTROENTEROLOGY | Facility: CLINIC | Age: 33
End: 2023-10-20
Payer: COMMERCIAL

## 2023-10-20 DIAGNOSIS — D50.0 ANEMIA, BLOOD LOSS: ICD-10-CM

## 2023-10-20 DIAGNOSIS — D50.0 IRON DEFICIENCY ANEMIA DUE TO CHRONIC BLOOD LOSS: Primary | ICD-10-CM

## 2023-10-20 LAB
ALBUMIN SERPL-MCNC: 3.7 G/DL (ref 4.1–5.1)
ALBUMIN/GLOB SERPL: 1.1 {RATIO} (ref 1.2–2.2)
ALP SERPL-CCNC: 158 IU/L (ref 44–121)
ALT SERPL-CCNC: 18 IU/L (ref 0–44)
AST SERPL-CCNC: 15 IU/L (ref 0–40)
BASOPHILS # BLD AUTO: 0.1 X10E3/UL (ref 0–0.2)
BASOPHILS NFR BLD AUTO: 1 %
BILIRUB SERPL-MCNC: <0.2 MG/DL (ref 0–1.2)
BUN SERPL-MCNC: 6 MG/DL (ref 6–20)
BUN/CREAT SERPL: 9 (ref 9–20)
CALCIUM SERPL-MCNC: 9 MG/DL (ref 8.7–10.2)
CHLORIDE SERPL-SCNC: 103 MMOL/L (ref 96–106)
CO2 SERPL-SCNC: 21 MMOL/L (ref 20–29)
CREAT SERPL-MCNC: 0.69 MG/DL (ref 0.76–1.27)
CRP SERPL-MCNC: 19 MG/L (ref 0–10)
EGFRCR SERPLBLD CKD-EPI 2021: 127 ML/MIN/1.73
EOSINOPHIL # BLD AUTO: 1.9 X10E3/UL (ref 0–0.4)
EOSINOPHIL NFR BLD AUTO: 22 %
ERYTHROCYTE [DISTWIDTH] IN BLOOD BY AUTOMATED COUNT: 14.8 % (ref 11.6–15.4)
ERYTHROCYTE [SEDIMENTATION RATE] IN BLOOD BY WESTERGREN METHOD: 87 MM/HR (ref 0–15)
FERRITIN SERPL-MCNC: 13 NG/ML (ref 30–400)
FOLATE SERPL-MCNC: 10.7 NG/ML
GLOBULIN SER CALC-MCNC: 3.3 G/DL (ref 1.5–4.5)
GLUCOSE SERPL-MCNC: 98 MG/DL (ref 70–99)
HCT VFR BLD AUTO: 28.3 % (ref 37.5–51)
HGB BLD-MCNC: 8.8 G/DL (ref 13–17.7)
IMM GRANULOCYTES # BLD AUTO: 0 X10E3/UL (ref 0–0.1)
IMM GRANULOCYTES NFR BLD AUTO: 0 %
IRON SATN MFR SERPL: 4 % (ref 15–55)
IRON SERPL-MCNC: 13 UG/DL (ref 38–169)
LYMPHOCYTES # BLD AUTO: 2.5 X10E3/UL (ref 0.7–3.1)
LYMPHOCYTES NFR BLD AUTO: 29 %
MCH RBC QN AUTO: 23.3 PG (ref 26.6–33)
MCHC RBC AUTO-ENTMCNC: 31.1 G/DL (ref 31.5–35.7)
MCV RBC AUTO: 75 FL (ref 79–97)
MONOCYTES # BLD AUTO: 0.7 X10E3/UL (ref 0.1–0.9)
MONOCYTES NFR BLD AUTO: 8 %
MORPHOLOGY BLD-IMP: ABNORMAL
NEUTROPHILS # BLD AUTO: 3.4 X10E3/UL (ref 1.4–7)
NEUTROPHILS NFR BLD AUTO: 40 %
PLATELET # BLD AUTO: 563 X10E3/UL (ref 150–450)
POTASSIUM SERPL-SCNC: 3.9 MMOL/L (ref 3.5–5.2)
PROT SERPL-MCNC: 7 G/DL (ref 6–8.5)
RBC # BLD AUTO: 3.77 X10E6/UL (ref 4.14–5.8)
SODIUM SERPL-SCNC: 139 MMOL/L (ref 134–144)
TIBC SERPL-MCNC: 369 UG/DL (ref 250–450)
UIBC SERPL-MCNC: 356 UG/DL (ref 111–343)
VIT B12 SERPL-MCNC: 793 PG/ML (ref 232–1245)
WBC # BLD AUTO: 8.6 X10E3/UL (ref 3.4–10.8)

## 2023-10-20 NOTE — TELEPHONE ENCOUNTER
----- Message from January Madison PA-C sent at 10/20/2023  1:46 PM EDT -----  Please call patient (Hungarian speaking) and let him know labs show iron deficiency. He would benefit from Iron transfusion---can we get him with hematology ASAP to get this done. He should start oral iron 325mg BID over the counter.

## 2023-10-20 NOTE — PROGRESS NOTES
Please call patient (Azeri speaking) and let him know labs show iron deficiency. He would benefit from Iron transfusion---can we get him with hematology ASAP to get this done. He should start oral iron 325mg BID over the counter.

## 2023-10-20 NOTE — TELEPHONE ENCOUNTER
Called [facility] [employer] at [phone] and left vm for pt to call back.     Called [facility] office at [phone] and spoke with Genoveva who reports that Kina is in the referral now and is working on it

## 2023-10-26 ENCOUNTER — SPECIALTY PHARMACY (OUTPATIENT)
Dept: GASTROENTEROLOGY | Facility: CLINIC | Age: 33
End: 2023-10-26
Payer: COMMERCIAL

## 2023-10-26 ENCOUNTER — CONSULT (OUTPATIENT)
Dept: ONCOLOGY | Facility: CLINIC | Age: 33
End: 2023-10-26
Payer: COMMERCIAL

## 2023-10-26 ENCOUNTER — LAB (OUTPATIENT)
Dept: OTHER | Facility: HOSPITAL | Age: 33
End: 2023-10-26
Payer: COMMERCIAL

## 2023-10-26 VITALS
HEIGHT: 66 IN | DIASTOLIC BLOOD PRESSURE: 72 MMHG | WEIGHT: 118.7 LBS | OXYGEN SATURATION: 99 % | RESPIRATION RATE: 16 BRPM | HEART RATE: 76 BPM | TEMPERATURE: 98.2 F | BODY MASS INDEX: 19.08 KG/M2 | SYSTOLIC BLOOD PRESSURE: 111 MMHG

## 2023-10-26 DIAGNOSIS — D50.0 IRON DEFICIENCY ANEMIA DUE TO CHRONIC BLOOD LOSS: Primary | ICD-10-CM

## 2023-10-26 DIAGNOSIS — D64.9 ANEMIA, UNSPECIFIED TYPE: Primary | ICD-10-CM

## 2023-10-26 LAB
ANISOCYTOSIS BLD QL: NORMAL
BASOPHILS # BLD AUTO: 0.03 10*3/MM3 (ref 0–0.2)
BASOPHILS NFR BLD AUTO: 0.4 % (ref 0–1.5)
DEPRECATED RDW RBC AUTO: 43.4 FL (ref 37–54)
EOSINOPHIL # BLD AUTO: 0.2 10*3/MM3 (ref 0–0.4)
EOSINOPHIL NFR BLD AUTO: 2.4 % (ref 0.3–6.2)
ERYTHROCYTE [DISTWIDTH] IN BLOOD BY AUTOMATED COUNT: 16.9 % (ref 12.3–15.4)
HCT VFR BLD AUTO: 29.1 % (ref 37.5–51)
HGB BLD-MCNC: 8.8 G/DL (ref 13–17.7)
HYPOCHROMIA BLD QL: NORMAL
IMM GRANULOCYTES # BLD AUTO: 0.04 10*3/MM3 (ref 0–0.05)
IMM GRANULOCYTES NFR BLD AUTO: 0.5 % (ref 0–0.5)
LYMPHOCYTES # BLD AUTO: 2 10*3/MM3 (ref 0.7–3.1)
LYMPHOCYTES NFR BLD AUTO: 24.4 % (ref 19.6–45.3)
MCH RBC QN AUTO: 22.6 PG (ref 26.6–33)
MCHC RBC AUTO-ENTMCNC: 30.2 G/DL (ref 31.5–35.7)
MCV RBC AUTO: 74.6 FL (ref 79–97)
MONOCYTES # BLD AUTO: 0.61 10*3/MM3 (ref 0.1–0.9)
MONOCYTES NFR BLD AUTO: 7.4 % (ref 5–12)
NEUTROPHILS NFR BLD AUTO: 5.32 10*3/MM3 (ref 1.7–7)
NEUTROPHILS NFR BLD AUTO: 64.9 % (ref 42.7–76)
NRBC BLD AUTO-RTO: 0 /100 WBC (ref 0–0.2)
OVALOCYTES BLD QL SMEAR: NORMAL
PLAT MORPH BLD: NORMAL
PLATELET # BLD AUTO: 499 10*3/MM3 (ref 140–450)
PMV BLD AUTO: 9.2 FL (ref 6–12)
RBC # BLD AUTO: 3.9 10*6/MM3 (ref 4.14–5.8)
WBC MORPH BLD: NORMAL
WBC NRBC COR # BLD: 8.2 10*3/MM3 (ref 3.4–10.8)

## 2023-10-26 PROCEDURE — 85007 BL SMEAR W/DIFF WBC COUNT: CPT | Performed by: INTERNAL MEDICINE

## 2023-10-26 PROCEDURE — 99204 OFFICE O/P NEW MOD 45 MIN: CPT | Performed by: INTERNAL MEDICINE

## 2023-10-26 PROCEDURE — 1126F AMNT PAIN NOTED NONE PRSNT: CPT | Performed by: INTERNAL MEDICINE

## 2023-10-26 PROCEDURE — 85025 COMPLETE CBC W/AUTO DIFF WBC: CPT | Performed by: INTERNAL MEDICINE

## 2023-10-26 NOTE — PROGRESS NOTES
.     REASON FOR CONSULTATION:   Iron deficiency anemia  Provide an opinion on any further workup or treatment                             REQUESTING PHYSICIAN: January Madison PA-C  RECORDS OBTAINED:  Records of the patient's history including those obtained from the referring provider were reviewed and summarized in detail.    HISTORY OF PRESENT ILLNESS:  The patient is a 33 y.o. year old male  who is here for follow-up with the above-mentioned history.    Reviewed last GI note, 10/19/2023: Ulcerative pain colitis.  He was found to have iron deficiency anemia and referred to us.    He states he just recently got approved through Medicaid and is waiting to start treatment for ulcerative colitis.  In the meantime, he is on prednisone.  He started ferrous sulfate twice per day on October 20.  He is not having any GI side effects.    He has had ongoing rectal bleeding, thought to be due to ulcerative colitis.  Denies ice cravings.  Has dyspnea on exertion.  No SOA or chest pain at rest.    Past Medical History:   Diagnosis Date    Ulcerative colitis 07/02/2023     Past Surgical History:   Procedure Laterality Date    COLONOSCOPY N/A 06/29/2023    Procedure: COLONOSCOPY TO CECUM/TI WITH COLD BIOPSIES THROUGHOUT;  Surgeon: Jovanni Carrera MD;  Location: [facility] ENDOSCOPY;  Service: Gastroenterology;  Laterality: N/A;  pre: HEMATOCHEZIA  post: PAN -COLITIS    ENDOSCOPY N/A 06/29/2023    Procedure: ESOPHAGOGASTRODUODENOSCOPY WITH BIOPSIES;  Surgeon: Jovanni Carrera MD;  Location: [facility] ENDOSCOPY;  Service: Gastroenterology;  Laterality: N/A;  pre: MELENA  post: MILD ESOPHAGITIS, GASTRITIS       MEDICATIONS    Current Outpatient Medications:     omeprazole (priLOSEC) 40 MG capsule, Take 1 capsule by mouth Daily., Disp: 90 capsule, Rfl: 1    predniSONE (DELTASONE) 10 MG tablet, Take 4 tablets by mouth Daily for 14 days, THEN 3 tablets Daily for 7 days, THEN 2 tablets Daily for 7 days, THEN 1 tablet Daily  "for 7 days., Disp: 98 tablet, Rfl: 0    ALLERGIES:   No Known Allergies    SOCIAL HISTORY:       Social History     Socioeconomic History    Marital status: Single   Tobacco Use    Smoking status: Former     Packs/day: 0.50     Years: 1.00     Additional pack years: 0.00     Total pack years: 0.50     Types: Cigarettes     Quit date: 1/1/2020     Years since quitting: 3.8     Passive exposure: Past    Smokeless tobacco: Never   Vaping Use    Vaping Use: Never used   Substance and Sexual Activity    Alcohol use: Never    Drug use: Yes     Types: Marijuana     Comment: once a day    Sexual activity: Defer         FAMILY HISTORY:  No family history on file.    REVIEW OF SYSTEMS:  Review of Systems   Constitutional:  Negative for activity change.   HENT:  Negative for nosebleeds and trouble swallowing.    Respiratory:  Negative for shortness of breath and wheezing.    Cardiovascular:  Negative for chest pain and palpitations.   Gastrointestinal:  Negative for constipation, diarrhea and nausea.   Genitourinary:  Negative for dysuria and hematuria.   Musculoskeletal:  Negative for arthralgias and myalgias.   Neurological:  Negative for seizures and syncope.   Hematological:  Negative for adenopathy. Does not bruise/bleed easily.   Psychiatric/Behavioral:  Negative for confusion.               Vitals:    10/26/23 0955   BP: 111/72   Pulse: 76   Resp: 16   Temp: 98.2 °F (36.8 °C)   TempSrc: Temporal   SpO2: 99%   Weight: 53.8 kg (118 lb 11.2 oz)   Height: 167.6 cm (65.98\")   PainSc: 0-No pain         10/26/2023     9:59 AM   Current Status   ECOG score 0      PHYSICAL EXAM:        CONSTITUTIONAL:  Vital signs reviewed.  No distress, looks comfortable.  EYES:  Conjunctiva and lids unremarkable.  PERRLA  EARS,NOSE,MOUTH,THROAT:  Ears and nose appear unremarkable.  Lips, teeth, gums appear unremarkable.  RESPIRATORY:  Normal respiratory effort.  Lungs clear to auscultation bilaterally.  CARDIOVASCULAR:  Normal S1, S2.  No " murmurs rubs or gallops.  No significant lower extremity edema.  GASTROINTESTINAL: Abdomen appears unremarkable.  Nontender.  No hepatomegaly.  No splenomegaly.  LYMPHATIC:  No cervical, supraclavicular, axillary lymphadenopathy.  SKIN:  Warm.  No rashes.  PSYCHIATRIC:  Normal judgment and insight.  Normal mood and affect.       RECENT LABS:        WBC   Date Value Ref Range Status   10/26/2023 8.20 3.40 - 10.80 10*3/mm3 Final   10/19/2023 8.6 3.4 - 10.8 x10E3/uL Final   08/18/2023 9.54 3.40 - 10.80 10*3/mm3 Final   07/27/2023 8.1 3.4 - 10.8 x10E3/uL Final   07/21/2023 5.20 3.40 - 10.80 10*3/mm3 Final   07/02/2023 9.35 3.40 - 10.80 10*3/mm3 Final   07/01/2023 7.61 3.40 - 10.80 10*3/mm3 Final   06/30/2023 6.71 3.40 - 10.80 10*3/mm3 Final   06/29/2023 5.88 3.40 - 10.80 10*3/mm3 Final   06/28/2023 7.62 3.40 - 10.80 10*3/mm3 Final   06/27/2023 8.29 3.40 - 10.80 10*3/mm3 Final     Hemoglobin   Date Value Ref Range Status   10/26/2023 8.8 (L) 13.0 - 17.7 g/dL Final   10/19/2023 8.8 (L) 13.0 - 17.7 g/dL Final   08/18/2023 12.0 (L) 13.0 - 17.7 g/dL Final   07/27/2023 13.4 13.0 - 17.7 g/dL Final   07/21/2023 13.0 13.0 - 17.7 g/dL Final   07/02/2023 12.6 (L) 13.0 - 17.7 g/dL Final   07/01/2023 12.7 (L) 13.0 - 17.7 g/dL Final   06/30/2023 11.7 (L) 13.0 - 17.7 g/dL Final   06/29/2023 11.6 (L) 13.0 - 17.7 g/dL Final   06/28/2023 12.5 (L) 13.0 - 17.7 g/dL Final   06/27/2023 14.5 13.0 - 17.7 g/dL Final     Platelets   Date Value Ref Range Status   10/26/2023 499 (H) 140 - 450 10*3/mm3 Final   10/19/2023 563 (H) 150 - 450 x10E3/uL Final   08/18/2023 393 140 - 450 10*3/mm3 Final   07/27/2023 247 150 - 450 x10E3/uL Final   07/21/2023 198 140 - 450 10*3/mm3 Final   07/02/2023 247 140 - 450 10*3/mm3 Final   07/01/2023 220 140 - 450 10*3/mm3 Final   06/30/2023 189 140 - 450 10*3/mm3 Final   06/29/2023 186 140 - 450 10*3/mm3 Final   06/28/2023 210 140 - 450 10*3/mm3 Final   06/27/2023 218 140 - 450 10*3/mm3 Final       Assessment & Plan    There are no diagnoses linked to this encounter.      Magdy Guerrero   *Iron deficiency anemia  Begin ferrous sulfate twice per day 10/20/2023.  10/26/2023 Hb 8.8, unchanged from 10/19/2023.  No GI side effects yet from ferrous sulfate.  Therefore, continue this for another month or so and reassess to see if it is working.  He will call us if he becomes intolerant to oral iron in which case we will arrange IV iron.    *Source of iron deficiency  Follows with GI for ulcerative colitis    *Ulcerative colitis    *Microcytosis, likely due to iron deficiency.    *Thrombocytosis, likely due to iron deficiency    Plan  Continue ferrous sulfate.  Take up to 3 times per day if tolerated  He will call us if he does not tolerate oral iron in which case we will arrange IV iron  MD CBC stat ferritin, stat iron panel, reticulate hemoglobin roughly 5 weeks

## 2023-10-27 ENCOUNTER — SPECIALTY PHARMACY (OUTPATIENT)
Dept: GASTROENTEROLOGY | Facility: CLINIC | Age: 33
End: 2023-10-27
Payer: COMMERCIAL

## 2023-10-27 DIAGNOSIS — K51.011 ULCERATIVE PANCOLITIS WITH RECTAL BLEEDING: Primary | ICD-10-CM

## 2023-10-27 RX ORDER — ADALIMUMAB 80MG/0.8ML
KIT SUBCUTANEOUS
Qty: 3 EACH | Refills: 0 | Status: SHIPPED | OUTPATIENT
Start: 2023-10-27

## 2023-10-27 RX ORDER — ADALIMUMAB 40MG/0.4ML
40 KIT SUBCUTANEOUS
Qty: 2 EACH | Refills: 10 | Status: SHIPPED | OUTPATIENT
Start: 2023-10-27

## 2023-10-27 NOTE — PROGRESS NOTES
Specialty Pharmacy     Simponi appeal denied    Humira pa approved  The request has been approved. The authorization is effective from 10/27/2023 to 04/26/2024, as long as the member is enrolled in their current health plan. The request was approved as submitted. A written notification letter will follow with additional details.     Ela Quiroga  Specialty Pharmacy Technician

## 2023-10-30 ENCOUNTER — SPECIALTY PHARMACY (OUTPATIENT)
Dept: GASTROENTEROLOGY | Facility: CLINIC | Age: 33
End: 2023-10-30
Payer: COMMERCIAL

## 2023-10-30 NOTE — PROGRESS NOTES
Specialty Pharmacy Patient Management Program  Gastroenterology Initial Assessment     Magdy Guerrero is a 33 y.o. male seen by a Gastroenterology provider for Ulcerative Colitis and enrolled in the Patient Management program offered by [facility]. An initial outreach was conducted, including assessment of therapy appropriateness and specialty medication education for Humira (adalimumab). The patient was introduced to services offered by [facility], including: regular assessments, refill coordination, curbside pick-up or mail order delivery options, prior authorization maintenance, and financial assistance programs as applicable. The patient was also provided with contact information for the pharmacy team.     Insurance Coverage & Financial Support  Prior authorization approved - $0 copay      Relevant Past Medical History and Comorbidities  Relevant medical history and concomitant health conditions were discussed with the patient. The patient's chart has been reviewed for relevant past medical history and comorbid health conditions and updated as necessary.   Past Medical History:   Diagnosis Date    Ulcerative colitis 07/02/2023     Social History     Socioeconomic History    Marital status: Single   Tobacco Use    Smoking status: Former     Packs/day: 0.50     Years: 1.00     Additional pack years: 0.00     Total pack years: 0.50     Types: Cigarettes     Quit date: 1/1/2020     Years since quitting: 3.8     Passive exposure: Past    Smokeless tobacco: Never   Vaping Use    Vaping Use: Never used   Substance and Sexual Activity    Alcohol use: Never    Drug use: Yes     Types: Marijuana     Comment: once a day    Sexual activity: Defer     Problem list reviewed by Erin Hilton, PharmD on 10/30/2023 at 10:21 AM    Allergies  Known allergies and reactions were discussed with the patient. The patient's chart has been reviewed for allergy information and updated as  necessary.   Patient has no known allergies.  Allergies reviewed by Erin Hilton, PharmD on 10/30/2023 at 10:20 AM    Current Medication List  This medication list has been reviewed with the patient and evaluated for any interactions or necessary modifications/recommendations, and updated to include all prescription medications, OTC medications, and supplements the patient is currently taking. This list reflects what is contained in the patient's profile, which has also been marked as reviewed to communicate to other providers it is the most up to date version of the patient's current medication therapy.     Current Outpatient Medications:     Adalimumab (Humira Pen) 40 MG/0.4ML Pen-injector Kit, Inject 40 mg under the skin into the appropriate area as directed Every 14 (Fourteen) Days., Disp: 2 each, Rfl: 10    Adalimumab (Humira Pen-CD/UC/HS Starter) 80 MG/0.8ML injection, Inject 160 mg (2 pens) subcutaneously on day 1 and 80 mg (1 pen) subcutaneously on day 15. Start maintenance dose on day 29., Disp: 3 each, Rfl: 0    omeprazole (priLOSEC) 40 MG capsule, Take 1 capsule by mouth Daily., Disp: 90 capsule, Rfl: 1    predniSONE (DELTASONE) 10 MG tablet, Take 4 tablets by mouth Daily for 14 days, THEN 3 tablets Daily for 7 days, THEN 2 tablets Daily for 7 days, THEN 1 tablet Daily for 7 days., Disp: 98 tablet, Rfl: 0  Medicines reviewed by Erin Hilton, PharmD on 10/30/2023 at 10:21 AM    Drug Interactions  none     Relevant Laboratory Values  Lab Results   Component Value Date    GLUCOSE 98 10/19/2023    BUN 6 10/19/2023    CREATININE 0.69 (L) 10/19/2023    BCR 9 10/19/2023     10/19/2023    K 3.9 10/19/2023     10/19/2023    CO2 21 10/19/2023    CALCIUM 9.0 10/19/2023    PROTEINTOT 6.3 07/21/2023    ALBUMIN 3.7 (L) 10/19/2023    ALT 18 10/19/2023    AST 15 10/19/2023    ALKPHOS 158 (H) 10/19/2023    BILITOT <0.2 10/19/2023    ANIONGAP 9.0 07/21/2023    PHOS 4.2 07/02/2023    MG 2.0 07/02/2023     EGFRRESULT 127 10/19/2023     Lab Results   Component Value Date    WBC 8.20 10/26/2023    HGB 8.8 (L) 10/26/2023    HCT 29.1 (L) 10/26/2023     (H) 10/26/2023     Lab Results   Component Value Date    HAV Positive (A) 07/27/2023    HEPAIGM Non-Reactive 06/27/2023    HEPBIGMCORE Non-Reactive 06/27/2023    HEPBSAB Reactive 07/27/2023    HEPBSAG Non-Reactive 06/27/2023    HEPCVIRUSABY Non-Reactive 06/27/2023     Lab Results   Component Value Date    QUANTITBGLDP Negative 07/27/2023     Lab Value Review  The above lab values have been reviewed; the following specialty medication(s) dose adjustment(s) are recommended: none.    Initial Education Provided for Specialty Medication  The patient has been provided with the following education and any applicable administration techniques (i.e. self-injection) have been demonstrated for the therapies indicated. All questions and concerns have been addressed prior to the patient receiving the medication, and the patient has verbalized understanding of the education and any materials provided. Additional patient education shall be provided and documented upon request by the patient, provider, or payer.         Humira (adalimumab)         Medication Expectations   Why am I taking this medication? You are taking this medication for Crohn's disease, ulcerative colitis, rheumatoid or psoriatic arthritis.   What should I expect while on this medication? You should expect a decrease in the frequency and severity of symptoms.   How does the medication work? Adalimumab binds to TNF-alpha therefore interfering with binding to a TNFa receptor site.   How long will I be on this medication for? The amount of time you will be on this medication will be determined by your doctor and your response to the medication.    How do I take this medication? Take as directed on your prescription label.  This medication is a subcutaneous injection given in the fatty part of the skin on the top of  the thigh or stomach area. May leave at room temperature for 15-30 minutes prior to injection.   What are some possible side effects? Injection site reactions and hypersensitivity reactions, headache, signs of a common cold, stomach pain, upset stomach, or back pain.   What happens if I miss a dose? If you miss a dose, take it as soon as you remember. If it is close to the time for your next dose, skip the missed dose and go back to your normal time.               Medication Safety   What are things I should warn my doctor immediately about? Allergic reaction such has hives or trouble breathing. If you develop symptoms such as a cough that does not go away, weight loss, changes in how often you urinate, numbness or tingling in extremities, rash on cheeks or other body parts, unusual bleeding or bruising.     What are things that I should be cautious of? Injection site reaction, back pain, and headache. You may have more chance of getting an infection.  Wash your hands often and stay away from people with infections, colds, or flu.   What are some medications that can interact with this one? Immunosuppressants and vaccines.            Medication Storage/Handling   How should I handle this medication? Keep this medication our of reach of pets/children in original container.  Store in the original container to protect from light. Do not inject where the skin is tender, bruised, red, hard, or affected by psoriasis.  Rotate injection sites.   How does this medication need to be stored? Store in refrigerator and keep dry. If needed, you may store at room temperature for up to 14 days, but do not return to the refrigerator after it has reached room temperature.    How should I dispose of this medication? You can dispose of the empty syringe in a sharps container, and if this is not available you may use an empty hard plastic container such as a milk jug or tide container.            Resources/Support   How can I remind  myself to take this medication? You can download a reminder [email or web address] on your phone or use a calandar  to help with your injection.   Is financial support available?  Yes, [employer] can provide co-pay cards if you have commercial insurance or patient assistance if you have Medicare or no insurance.    Which vaccines are recommended for me? Talk to your doctor about these vaccines: Flu, Coronavirus (COVID-19), Pneumococcal (pneumonia), Tdap, Hepatitis B, Zoster (shingles).            Adherence and Self-Administration  Barriers to Patient Adherence and/or Self-Administration: none   Methods for Supporting Patient Adherence and/or Self-Administration:  patient's partner is a nurse      Goals of Therapy   Goals Addressed Today        Specialty Pharmacy General Goal      At least 50% symptom reduction and mucosal healing               Reassessment Plan & Follow-Up  Medication Therapy Changes: start Humira injections when medication received  Additional Plans, Therapy Recommendations, or Therapy Problems to Be Addressed: none   Pharmacist to perform regular reassessments no more than (6) months from the previous assessment.  Welcome information and patient satisfaction survey to be sent by retail team with patient's initial fill.  Care Coordinator to set up future refill outreaches, coordinate prescription delivery, and escalate clinical questions to pharmacist.     Attestation  Therapeutic appropriateness: Appropriate   I attest the patient was actively involved in and has agreed to the above plan of care. I attest that the initiated specialty medication(s) are appropriate for the patient based on my assessment. If the prescribed therapy is at any point deemed not appropriate based on the current or future assessments, a consultation will be initiated with the patient's specialty care provider to determine the best course of action. The revised plan of therapy will be documented along with any required assessments and/or  additional patient education provided.     Erin Hilton, PharmD, BCACP, BCPS, BCCCP  Clinical Specialty Pharmacist, Gastroenterology  10/30/23 10:21 EDT

## 2023-11-02 ENCOUNTER — TELEPHONE (OUTPATIENT)
Dept: GASTROENTEROLOGY | Facility: CLINIC | Age: 33
End: 2023-11-02
Payer: COMMERCIAL

## 2023-11-02 RX ORDER — PREDNISONE 10 MG/1
TABLET ORAL
Qty: 98 TABLET | Refills: 0 | Status: SHIPPED | OUTPATIENT
Start: 2023-11-02 | End: 2023-12-07

## 2023-11-02 NOTE — TELEPHONE ENCOUNTER
I want him to start the taper on November 14.  I would like him to get 2 weeks of Humira in his system before we taper.  So he should stay at 40 mg until November 14 and then go down to 3 pills/day.  I sent a refill of the prednisone to the pharmacy if he needs it.

## 2023-11-02 NOTE — TELEPHONE ENCOUNTER
----- Message from January Madison PA-C sent at 11/1/2023  4:30 PM EDT -----  Could you please call patient and advise him to start tapering his prednisone around 11/14.  He should go down to 3/day for 7 days then 2/day for 7 days then 1/day for 7 days then come off.  He should be starting his Humira today or tomorrow.

## 2023-11-15 ENCOUNTER — TELEPHONE (OUTPATIENT)
Dept: GASTROENTEROLOGY | Facility: CLINIC | Age: 33
End: 2023-11-15
Payer: COMMERCIAL

## 2023-11-15 ENCOUNTER — SPECIALTY PHARMACY (OUTPATIENT)
Dept: GASTROENTEROLOGY | Facility: CLINIC | Age: 33
End: 2023-11-15
Payer: COMMERCIAL

## 2023-11-15 DIAGNOSIS — D50.0 ANEMIA, BLOOD LOSS: ICD-10-CM

## 2023-11-15 DIAGNOSIS — K92.1 HEMATOCHEZIA: Primary | ICD-10-CM

## 2023-11-15 NOTE — TELEPHONE ENCOUNTER
Can you call him on the  line and clarify which medicine he is talking about.  Is he talking about the prednisone which commonly causes breakouts of pimples.  Or is he talking about the Simponi which is the injectable for his ulcerative colitis.  Hopefully we finally got him approved for his UC.    Also, I sent in some hydrocortisone cream for his hemorrhoids which can be helpful if he needs it.

## 2023-11-15 NOTE — PROGRESS NOTES
Specialty Pharmacy Refill Coordination Note     Magdy is a 33 y.o. male contacted today regarding refills of  1 specialty medication(s).    Reviewed and verified with patient: Humira    Specialty medication(s) and dose(s) confirmed: yes    Refill Questions      Flowsheet Row Most Recent Value   Changes to allergies? No   Changes to medications? No   New conditions since last clinic visit No   Unplanned office visit, urgent care, ED, or hospital admission in the last 4 weeks  No   How does patient/caregiver feel medication is working? Very good   Financial problems or insurance changes  No   Since the previous refill, were any specialty medication doses or scheduled injections missed or delayed?  No   Does this patient require a clinical escalation to a pharmacist? No            Delivery Questions      Flowsheet Row Most Recent Value   Delivery method Other (Comment)  [beeline 11/16]   Delivery address correct? Yes   Delivery phone number [phone]   Number of medications in delivery 1   Medication(s) being filled and delivered Adalimumab   Doses left of specialty medications 1 week   Is there any medication that is due not being filled? No   Supplies needed? No supplies needed   Cooler needed? Yes   Do any medications need mixed or dated? No   Additional comments sig required   Questions or concerns for the pharmacist? Yes   Explain any questions or concerns for the pharmacist A few pimples broke out on my face and body, as well as a small hemorrhoid that hurt and bled at first when it came out, but it doesn't anymore.   Are any medications first time fills? No   Shipment status Cooler packed              question and concern sent to provider      Follow-up: 21 day(s)     Ela Quiroga  Specialty Pharmacy Technician

## 2023-11-15 NOTE — TELEPHONE ENCOUNTER
----- Message from Magdy Guerrero sent at 11/14/2023  4:19 PM EST -----  Regarding: rash on my fece   Contact: [phone]  Seema, how are you? I already put on the last dose that I had today, a week ago. A few pimples broke out on my face and body, as well as a small hemorrhoid that hurt and bled at first when it came out, but it doesn't anymore.

## 2023-11-16 NOTE — TELEPHONE ENCOUNTER
Magdy [facility] (supporting Velma Ruffin RN)57 minutes ago (8:22 AM)       I'm not sure but I think it's the prednisone that's causing the pimples.

## 2023-11-20 RX ORDER — PREDNISONE 10 MG/1
TABLET ORAL
Qty: 95 TABLET | Refills: 0 | Status: SHIPPED | OUTPATIENT
Start: 2023-11-20 | End: 2023-12-25

## 2023-11-20 NOTE — TELEPHONE ENCOUNTER
Using  line, I called patient...      Reports blood color in the toilet bowl Abdominal cramps--relieved with BM, for the last several days, trouble sleeping due increased bowel movements at night. Symptoms worsened after he went down to 30mg prednisone.     Denies fever, SOA, dizziness.    Recommend he increase to 40mg prednisone until next Humira injection--due on 11/28---then try to drop back down to 35mg Prednisone instead.  We will check labs tomorrow (patient unable to come back into the office today) to assess hemoglobin.  I advised if the bleeding were to worsen, he should become dizzy, shortness of air, he should go right to the ED.    Labs placed and new prescription for prednisone sent to the pharmacy.

## 2023-11-21 ENCOUNTER — LAB (OUTPATIENT)
Dept: GASTROENTEROLOGY | Facility: CLINIC | Age: 33
End: 2023-11-21
Payer: COMMERCIAL

## 2023-12-01 ENCOUNTER — OFFICE VISIT (OUTPATIENT)
Dept: ONCOLOGY | Facility: CLINIC | Age: 33
End: 2023-12-01
Payer: COMMERCIAL

## 2023-12-01 ENCOUNTER — LAB (OUTPATIENT)
Dept: OTHER | Facility: HOSPITAL | Age: 33
End: 2023-12-01
Payer: COMMERCIAL

## 2023-12-01 VITALS
WEIGHT: 126.9 LBS | HEIGHT: 66 IN | RESPIRATION RATE: 16 BRPM | SYSTOLIC BLOOD PRESSURE: 133 MMHG | BODY MASS INDEX: 20.39 KG/M2 | OXYGEN SATURATION: 98 % | DIASTOLIC BLOOD PRESSURE: 76 MMHG | HEART RATE: 87 BPM | TEMPERATURE: 98.4 F

## 2023-12-01 DIAGNOSIS — D50.0 IRON DEFICIENCY ANEMIA DUE TO CHRONIC BLOOD LOSS: ICD-10-CM

## 2023-12-01 DIAGNOSIS — D50.0 IRON DEFICIENCY ANEMIA DUE TO CHRONIC BLOOD LOSS: Primary | ICD-10-CM

## 2023-12-01 LAB
BASOPHILS # BLD AUTO: 0.03 10*3/MM3 (ref 0–0.2)
BASOPHILS NFR BLD AUTO: 0.3 % (ref 0–1.5)
C3 FRG RBC-MCNC: NORMAL
DEPRECATED RDW RBC AUTO: 68.7 FL (ref 37–54)
EOSINOPHIL # BLD AUTO: 0.07 10*3/MM3 (ref 0–0.4)
EOSINOPHIL NFR BLD AUTO: 0.8 % (ref 0.3–6.2)
ERYTHROCYTE [DISTWIDTH] IN BLOOD BY AUTOMATED COUNT: 23.5 % (ref 12.3–15.4)
FERRITIN SERPL-MCNC: 23.6 NG/ML (ref 30–400)
HCT VFR BLD AUTO: 39.9 % (ref 37.5–51)
HGB BLD-MCNC: 12.5 G/DL (ref 13–17.7)
HGB RETIC QN AUTO: 35.2 PG (ref 29.8–36.1)
HYPOCHROMIA BLD QL: NORMAL
IMM GRANULOCYTES # BLD AUTO: 0.03 10*3/MM3 (ref 0–0.05)
IMM GRANULOCYTES NFR BLD AUTO: 0.3 % (ref 0–0.5)
IMM RETICS NFR: 9.5 % (ref 3–15.8)
IRON 24H UR-MRATE: 58 MCG/DL (ref 59–158)
IRON SATN MFR SERPL: 14 % (ref 20–50)
LYMPHOCYTES # BLD AUTO: 1.91 10*3/MM3 (ref 0.7–3.1)
LYMPHOCYTES NFR BLD AUTO: 22.3 % (ref 19.6–45.3)
MCH RBC QN AUTO: 26.2 PG (ref 26.6–33)
MCHC RBC AUTO-ENTMCNC: 31.3 G/DL (ref 31.5–35.7)
MCV RBC AUTO: 83.6 FL (ref 79–97)
MONOCYTES # BLD AUTO: 0.82 10*3/MM3 (ref 0.1–0.9)
MONOCYTES NFR BLD AUTO: 9.6 % (ref 5–12)
NEUTROPHILS NFR BLD AUTO: 5.72 10*3/MM3 (ref 1.7–7)
NEUTROPHILS NFR BLD AUTO: 66.7 % (ref 42.7–76)
NRBC BLD AUTO-RTO: 0 /100 WBC (ref 0–0.2)
OVALOCYTES BLD QL SMEAR: NORMAL
PLAT MORPH BLD: NORMAL
PLATELET # BLD AUTO: 326 10*3/MM3 (ref 140–450)
PMV BLD AUTO: 8.3 FL (ref 6–12)
RBC # BLD AUTO: 4.77 10*6/MM3 (ref 4.14–5.8)
RETICS # AUTO: 0.07 10*6/MM3 (ref 0.02–0.13)
RETICS/RBC NFR AUTO: 1.52 % (ref 0.7–1.9)
TIBC SERPL-MCNC: 410 MCG/DL (ref 298–536)
TRANSFERRIN SERPL-MCNC: 275 MG/DL (ref 200–360)
WBC MORPH BLD: NORMAL
WBC NRBC COR # BLD AUTO: 8.58 10*3/MM3 (ref 3.4–10.8)

## 2023-12-01 PROCEDURE — 85046 RETICYTE/HGB CONCENTRATE: CPT | Performed by: INTERNAL MEDICINE

## 2023-12-01 PROCEDURE — 84466 ASSAY OF TRANSFERRIN: CPT | Performed by: INTERNAL MEDICINE

## 2023-12-01 PROCEDURE — 36415 COLL VENOUS BLD VENIPUNCTURE: CPT

## 2023-12-01 PROCEDURE — 83540 ASSAY OF IRON: CPT | Performed by: INTERNAL MEDICINE

## 2023-12-01 PROCEDURE — 85025 COMPLETE CBC W/AUTO DIFF WBC: CPT | Performed by: INTERNAL MEDICINE

## 2023-12-01 PROCEDURE — 85007 BL SMEAR W/DIFF WBC COUNT: CPT | Performed by: INTERNAL MEDICINE

## 2023-12-01 PROCEDURE — 82728 ASSAY OF FERRITIN: CPT | Performed by: INTERNAL MEDICINE

## 2023-12-01 PROCEDURE — 1126F AMNT PAIN NOTED NONE PRSNT: CPT | Performed by: INTERNAL MEDICINE

## 2023-12-01 PROCEDURE — 99214 OFFICE O/P EST MOD 30 MIN: CPT | Performed by: INTERNAL MEDICINE

## 2023-12-01 NOTE — PROGRESS NOTES
.     REASON FOR FOLLOWUP :   Iron deficiency anemia    HISTORY OF PRESENT ILLNESS:  The patient is a 33 y.o. year old male  who is here for follow-up with the above-mentioned history.    States he cannot tolerate anymore oral iron due to significant abdominal pain since starting oral iron.  Continues to have bleeding which he feels is due to ulcerative colitis.    Past Medical History:   Diagnosis Date    History of iron deficiency anemia     Ulcerative colitis 07/02/2023     Past Surgical History:   Procedure Laterality Date    COLONOSCOPY N/A 06/29/2023    Procedure: COLONOSCOPY TO CECUM/TI WITH COLD BIOPSIES THROUGHOUT;  Surgeon: Jovanni Carrera MD;  Location: [facility] ENDOSCOPY;  Service: Gastroenterology;  Laterality: N/A;  pre: HEMATOCHEZIA  post: PAN -COLITIS    ENDOSCOPY N/A 06/29/2023    Procedure: ESOPHAGOGASTRODUODENOSCOPY WITH BIOPSIES;  Surgeon: Jovanni Carrera MD;  Location: [facility] ENDOSCOPY;  Service: Gastroenterology;  Laterality: N/A;  pre: MELENA  post: MILD ESOPHAGITIS, GASTRITIS       MEDICATIONS    Current Outpatient Medications:     Adalimumab (Humira Pen) 40 MG/0.4ML Pen-injector Kit, Inject 40 mg under the skin into the appropriate area as directed Every 14 (Fourteen) Days., Disp: 2 each, Rfl: 10    Adalimumab (Humira Pen-CD/UC/HS Starter) 80 MG/0.8ML injection, Inject 160 mg (2 pens) subcutaneously on day 1 and 80 mg (1 pen) subcutaneously on day 15. Start maintenance dose on day 29., Disp: 3 each, Rfl: 0    hydrocortisone 2.5 % cream, Apply 1 application  topically to the appropriate area as directed 2 (Two) Times a Day., Disp: 30 g, Rfl: 1    omeprazole (priLOSEC) 40 MG capsule, Take 1 capsule by mouth Daily., Disp: 90 capsule, Rfl: 1    predniSONE (DELTASONE) 10 MG tablet, Take 4 tablets by mouth Daily for 7 days, THEN 3.5 tablets Daily for 7 days, THEN 3 tablets Daily for 7 days, THEN 2 tablets Daily for 7 days, THEN 1 tablet Daily for 7 days., Disp: 95 tablet, Rfl:  "0    ALLERGIES:   No Known Allergies    SOCIAL HISTORY:       Social History     Socioeconomic History    Marital status: Single   Tobacco Use    Smoking status: Former     Packs/day: 0.50     Years: 1.00     Additional pack years: 0.00     Total pack years: 0.50     Types: Cigarettes     Quit date: 1/1/2020     Years since quitting: 3.9     Passive exposure: Past    Smokeless tobacco: Never   Vaping Use    Vaping Use: Never used   Substance and Sexual Activity    Alcohol use: Never    Drug use: Yes     Types: Marijuana     Comment: once a day    Sexual activity: Defer         FAMILY HISTORY:  No family history on file.    REVIEW OF SYSTEMS:  Review of Systems   Constitutional:  Negative for activity change.   HENT:  Negative for nosebleeds and trouble swallowing.    Respiratory:  Negative for shortness of breath and wheezing.    Cardiovascular:  Negative for chest pain and palpitations.   Gastrointestinal:  Negative for constipation, diarrhea and nausea.   Genitourinary:  Negative for dysuria and hematuria.   Musculoskeletal:  Negative for arthralgias and myalgias.   Neurological:  Negative for seizures and syncope.   Hematological:  Negative for adenopathy. Does not bruise/bleed easily.   Psychiatric/Behavioral:  Negative for confusion.               Vitals:    12/01/23 1041   BP: 133/76   Pulse: 87   Resp: 16   Temp: 98.4 °F (36.9 °C)   TempSrc: Temporal   SpO2: 98%   Weight: 57.6 kg (126 lb 14.4 oz)   Height: 167.6 cm (65.98\")   PainSc: 0-No pain         12/1/2023    10:42 AM   Current Status   ECOG score 0      PHYSICAL EXAM:        CONSTITUTIONAL:  Vital signs reviewed.  No distress, looks comfortable.  EYES:  Conjunctiva and lids unremarkable.  PERRLA  EARS,NOSE,MOUTH,THROAT:  Ears and nose appear unremarkable.  Lips, teeth, gums appear unremarkable.  RESPIRATORY:  Normal respiratory effort.  Lungs clear to auscultation bilaterally.  CARDIOVASCULAR:  Normal S1, S2.  No murmurs rubs or gallops.  No significant " lower extremity edema.  GASTROINTESTINAL: Abdomen appears unremarkable.  Nontender.  No hepatomegaly.  No splenomegaly.  LYMPHATIC:  No cervical, supraclavicular, axillary lymphadenopathy.  SKIN:  Warm.  Red raised rash  PSYCHIATRIC:  Normal judgment and insight.  Normal mood and affect.       RECENT LABS:        WBC   Date Value Ref Range Status   12/01/2023 8.58 3.40 - 10.80 10*3/mm3 Final   11/21/2023 7.25 3.40 - 10.80 10*3/mm3 Final   10/26/2023 8.20 3.40 - 10.80 10*3/mm3 Final   10/19/2023 8.6 3.4 - 10.8 x10E3/uL Final   08/18/2023 9.54 3.40 - 10.80 10*3/mm3 Final   07/27/2023 8.1 3.4 - 10.8 x10E3/uL Final   07/21/2023 5.20 3.40 - 10.80 10*3/mm3 Final   07/02/2023 9.35 3.40 - 10.80 10*3/mm3 Final   07/01/2023 7.61 3.40 - 10.80 10*3/mm3 Final   06/30/2023 6.71 3.40 - 10.80 10*3/mm3 Final   06/29/2023 5.88 3.40 - 10.80 10*3/mm3 Final   06/28/2023 7.62 3.40 - 10.80 10*3/mm3 Final   06/27/2023 8.29 3.40 - 10.80 10*3/mm3 Final     Hemoglobin   Date Value Ref Range Status   12/01/2023 12.5 (L) 13.0 - 17.7 g/dL Final   11/21/2023 12.2 (L) 13.0 - 17.7 g/dL Final   10/26/2023 8.8 (L) 13.0 - 17.7 g/dL Final   10/19/2023 8.8 (L) 13.0 - 17.7 g/dL Final   08/18/2023 12.0 (L) 13.0 - 17.7 g/dL Final   07/27/2023 13.4 13.0 - 17.7 g/dL Final   07/21/2023 13.0 13.0 - 17.7 g/dL Final   07/02/2023 12.6 (L) 13.0 - 17.7 g/dL Final   07/01/2023 12.7 (L) 13.0 - 17.7 g/dL Final   06/30/2023 11.7 (L) 13.0 - 17.7 g/dL Final   06/29/2023 11.6 (L) 13.0 - 17.7 g/dL Final   06/28/2023 12.5 (L) 13.0 - 17.7 g/dL Final   06/27/2023 14.5 13.0 - 17.7 g/dL Final     Platelets   Date Value Ref Range Status   12/01/2023 326 140 - 450 10*3/mm3 Final   11/21/2023 338 140 - 450 10*3/mm3 Final   10/26/2023 499 (H) 140 - 450 10*3/mm3 Final   10/19/2023 563 (H) 150 - 450 x10E3/uL Final   08/18/2023 393 140 - 450 10*3/mm3 Final   07/27/2023 247 150 - 450 x10E3/uL Final   07/21/2023 198 140 - 450 10*3/mm3 Final   07/02/2023 247 140 - 450 10*3/mm3 Final    07/01/2023 220 140 - 450 10*3/mm3 Final   06/30/2023 189 140 - 450 10*3/mm3 Final   06/29/2023 186 140 - 450 10*3/mm3 Final   06/28/2023 210 140 - 450 10*3/mm3 Final   06/27/2023 218 140 - 450 10*3/mm3 Final       Assessment & Plan   There are no diagnoses linked to this encounter.      Magdy Guerrero   *Iron deficiency anemia  Begin ferrous sulfate twice per day 10/20/2023.  10/26/2023 Hb 8.8, unchanged from 10/19/2023.  No GI side effects yet from ferrous sulfate.  Therefore, continue this for another month or so and reassess to see if it is working.  He will call us if he becomes intolerant to oral iron in which case we will arrange IV iron.  11/21/2023: Ferritin 30, 7% saturation, Hb 12.2.  B12 459.  Serum folate >20.  12/1/2023: Hb 12.5.  Cannot tolerate oral iron due to abdominal pain.  Arrange Injectafer x 2 doses.  If insurance will not cover Injectafer then arrange Venofer 200 mg x 5 doses.      *Possible allergy to ferrous sulfate  12/1/2023: Red raised rash bilateral arms and chest.  He states this has been present since beginning oral iron.  Premedicine's for IV iron: Pepcid 20 mg IV, Benadryl 25 mg IV, Solu-Cortef 40 mg IV, Tylenol 650 mg oral    *Source of iron deficiency  Follows with GI for ulcerative colitis    *Ulcerative colitis    *Microcytosis, likely due to iron deficiency.  MCV 83.6    *Thrombocytosis, likely due to iron deficiency      Plan  Arrange Injectafer x 2 doses.  If insurance will not cover Injectafer then arrange Venofer 200 mg x 5 doses.    Because of a red raised rash that may have been caused by oral ferrous sulfate, premedicine's for IV iron:   Pepcid 20 mg IV, Benadryl 25 mg IV, Solu-Cortef 40 mg IV, Tylenol 650 mg oral  MD 3 months.  At least 1 day prior: Ferritin, iron panel, CBC, reticulate hemoglobin

## 2023-12-05 ENCOUNTER — TELEPHONE (OUTPATIENT)
Dept: GASTROENTEROLOGY | Facility: CLINIC | Age: 33
End: 2023-12-05
Payer: COMMERCIAL

## 2023-12-05 ENCOUNTER — INFUSION (OUTPATIENT)
Dept: ONCOLOGY | Facility: HOSPITAL | Age: 33
End: 2023-12-05
Payer: COMMERCIAL

## 2023-12-05 VITALS
HEIGHT: 63 IN | TEMPERATURE: 97.7 F | BODY MASS INDEX: 22.08 KG/M2 | DIASTOLIC BLOOD PRESSURE: 70 MMHG | SYSTOLIC BLOOD PRESSURE: 120 MMHG | RESPIRATION RATE: 16 BRPM | HEART RATE: 87 BPM | WEIGHT: 124.6 LBS | OXYGEN SATURATION: 97 %

## 2023-12-05 DIAGNOSIS — D50.0 IRON DEFICIENCY ANEMIA DUE TO CHRONIC BLOOD LOSS: Primary | ICD-10-CM

## 2023-12-05 PROCEDURE — 25010000002 HYDROCORTISONE SOD SUC (PF) 100 MG RECONSTITUTED SOLUTION: Performed by: INTERNAL MEDICINE

## 2023-12-05 PROCEDURE — 96374 THER/PROPH/DIAG INJ IV PUSH: CPT

## 2023-12-05 PROCEDURE — 25010000002 DIPHENHYDRAMINE PER 50 MG: Performed by: INTERNAL MEDICINE

## 2023-12-05 PROCEDURE — 96375 TX/PRO/DX INJ NEW DRUG ADDON: CPT

## 2023-12-05 PROCEDURE — 25010000002 IRON SUCROSE PER 1 MG: Performed by: INTERNAL MEDICINE

## 2023-12-05 PROCEDURE — 25810000003 SODIUM CHLORIDE 0.9 % SOLUTION: Performed by: INTERNAL MEDICINE

## 2023-12-05 RX ORDER — SODIUM CHLORIDE 9 MG/ML
20 INJECTION, SOLUTION INTRAVENOUS ONCE
Status: COMPLETED | OUTPATIENT
Start: 2023-12-05 | End: 2023-12-05

## 2023-12-05 RX ORDER — FAMOTIDINE 10 MG/ML
20 INJECTION, SOLUTION INTRAVENOUS ONCE
Status: COMPLETED | OUTPATIENT
Start: 2023-12-05 | End: 2023-12-05

## 2023-12-05 RX ORDER — ACETAMINOPHEN 325 MG/1
650 TABLET ORAL ONCE
Status: COMPLETED | OUTPATIENT
Start: 2023-12-05 | End: 2023-12-05

## 2023-12-05 RX ORDER — DIPHENHYDRAMINE HYDROCHLORIDE 50 MG/ML
25 INJECTION INTRAMUSCULAR; INTRAVENOUS ONCE
Status: COMPLETED | OUTPATIENT
Start: 2023-12-05 | End: 2023-12-05

## 2023-12-05 RX ADMIN — ACETAMINOPHEN 650 MG: 325 TABLET ORAL at 09:06

## 2023-12-05 RX ADMIN — DIPHENHYDRAMINE HYDROCHLORIDE 25 MG: 50 INJECTION INTRAMUSCULAR; INTRAVENOUS at 09:10

## 2023-12-05 RX ADMIN — HYDROCORTISONE SODIUM SUCCINATE 40 MG: 100 INJECTION, POWDER, FOR SOLUTION INTRAMUSCULAR; INTRAVENOUS at 09:13

## 2023-12-05 RX ADMIN — IRON SUCROSE 200 MG: 20 INJECTION, SOLUTION INTRAVENOUS at 09:45

## 2023-12-05 RX ADMIN — SODIUM CHLORIDE 20 ML/HR: 9 INJECTION, SOLUTION INTRAVENOUS at 08:55

## 2023-12-05 RX ADMIN — FAMOTIDINE 20 MG: 10 INJECTION INTRAVENOUS at 09:07

## 2023-12-05 NOTE — TELEPHONE ENCOUNTER
----- Message from Magdy Guerrero sent at 12/5/2023  9:12 AM EST -----  Regarding: i’m not improving anything   Contact: [phone]  hi how you doing good morning i am worse i’m bleeding again , i have a lot pain when i go to bathroom and i lost two pounds from day 12-01 to today and i go to bath 8 to 10 time at the day especially in the early mornings

## 2023-12-07 ENCOUNTER — INFUSION (OUTPATIENT)
Dept: ONCOLOGY | Facility: HOSPITAL | Age: 33
End: 2023-12-07
Payer: COMMERCIAL

## 2023-12-07 VITALS
TEMPERATURE: 98 F | WEIGHT: 123.6 LBS | SYSTOLIC BLOOD PRESSURE: 116 MMHG | HEIGHT: 63 IN | HEART RATE: 91 BPM | OXYGEN SATURATION: 97 % | DIASTOLIC BLOOD PRESSURE: 68 MMHG | BODY MASS INDEX: 21.9 KG/M2 | RESPIRATION RATE: 15 BRPM

## 2023-12-07 DIAGNOSIS — D50.0 IRON DEFICIENCY ANEMIA DUE TO CHRONIC BLOOD LOSS: Primary | ICD-10-CM

## 2023-12-07 PROCEDURE — 25010000002 HYDROCORTISONE SOD SUC (PF) 100 MG RECONSTITUTED SOLUTION: Performed by: NURSE PRACTITIONER

## 2023-12-07 PROCEDURE — 25010000002 IRON SUCROSE PER 1 MG: Performed by: NURSE PRACTITIONER

## 2023-12-07 PROCEDURE — 96375 TX/PRO/DX INJ NEW DRUG ADDON: CPT

## 2023-12-07 PROCEDURE — 25810000003 SODIUM CHLORIDE 0.9 % SOLUTION: Performed by: NURSE PRACTITIONER

## 2023-12-07 PROCEDURE — 96374 THER/PROPH/DIAG INJ IV PUSH: CPT

## 2023-12-07 PROCEDURE — 25010000002 DIPHENHYDRAMINE PER 50 MG: Performed by: NURSE PRACTITIONER

## 2023-12-07 RX ORDER — FAMOTIDINE 10 MG/ML
20 INJECTION, SOLUTION INTRAVENOUS ONCE
Status: COMPLETED | OUTPATIENT
Start: 2023-12-07 | End: 2023-12-07

## 2023-12-07 RX ORDER — SODIUM CHLORIDE 9 MG/ML
20 INJECTION, SOLUTION INTRAVENOUS ONCE
Status: COMPLETED | OUTPATIENT
Start: 2023-12-07 | End: 2023-12-07

## 2023-12-07 RX ORDER — DIPHENHYDRAMINE HYDROCHLORIDE 50 MG/ML
25 INJECTION INTRAMUSCULAR; INTRAVENOUS ONCE
Status: COMPLETED | OUTPATIENT
Start: 2023-12-07 | End: 2023-12-07

## 2023-12-07 RX ORDER — ACETAMINOPHEN 325 MG/1
650 TABLET ORAL ONCE
Status: COMPLETED | OUTPATIENT
Start: 2023-12-07 | End: 2023-12-07

## 2023-12-07 RX ADMIN — SODIUM CHLORIDE 20 ML/HR: 9 INJECTION, SOLUTION INTRAVENOUS at 09:28

## 2023-12-07 RX ADMIN — HYDROCORTISONE SODIUM SUCCINATE 40 MG: 100 INJECTION, POWDER, FOR SOLUTION INTRAMUSCULAR; INTRAVENOUS at 09:43

## 2023-12-07 RX ADMIN — FAMOTIDINE 20 MG: 10 INJECTION INTRAVENOUS at 09:42

## 2023-12-07 RX ADMIN — ACETAMINOPHEN 650 MG: 325 TABLET ORAL at 09:41

## 2023-12-07 RX ADMIN — IRON SUCROSE 200 MG: 20 INJECTION, SOLUTION INTRAVENOUS at 10:15

## 2023-12-07 RX ADMIN — DIPHENHYDRAMINE HYDROCHLORIDE 25 MG: 50 INJECTION INTRAMUSCULAR; INTRAVENOUS at 09:42

## 2023-12-08 ENCOUNTER — SPECIALTY PHARMACY (OUTPATIENT)
Dept: GASTROENTEROLOGY | Facility: CLINIC | Age: 33
End: 2023-12-08
Payer: COMMERCIAL

## 2023-12-08 NOTE — PROGRESS NOTES
Specialty Pharmacy Refill Coordination Note     Magdy is a 33 y.o. male contacted today regarding refills of  1 specialty medication(s).    Reviewed and verified with patient: Humira    Specialty medication(s) and dose(s) confirmed: yes    Refill Questions      Flowsheet Row Most Recent Value   Changes to allergies? No   Changes to medications? No   New conditions since last clinic visit No   Unplanned office visit, urgent care, ED, or hospital admission in the last 4 weeks  No   How does patient/caregiver feel medication is working? Fair   Financial problems or insurance changes  No   Since the previous refill, were any specialty medication doses or scheduled injections missed or delayed?  No   Does this patient require a clinical escalation to a pharmacist? No            Delivery Questions      Flowsheet Row Most Recent Value   Delivery method Other (Comment)  [beeline 12/11]   Delivery address correct? Yes   Delivery phone number [phone]   Number of medications in delivery 1   Medication(s) being filled and delivered Adalimumab   Doses left of specialty medications 1 week   Is there any medication that is due not being filled? No   Supplies needed? No supplies needed   Cooler needed? Yes   Do any medications need mixed or dated? No   Additional comments sig required   Questions or concerns for the pharmacist? No   Explain any questions or concerns for the pharmacist patient said no improvement with medication - in contact with provider and appt on 12/19   Are any medications first time fills? No                   Follow-up: 21 day(s)     Ela Quiroga  Specialty Pharmacy Technician

## 2023-12-12 ENCOUNTER — INFUSION (OUTPATIENT)
Dept: ONCOLOGY | Facility: HOSPITAL | Age: 33
End: 2023-12-12
Payer: COMMERCIAL

## 2023-12-12 VITALS
OXYGEN SATURATION: 99 % | WEIGHT: 124.4 LBS | BODY MASS INDEX: 22.04 KG/M2 | TEMPERATURE: 98 F | SYSTOLIC BLOOD PRESSURE: 126 MMHG | RESPIRATION RATE: 16 BRPM | DIASTOLIC BLOOD PRESSURE: 70 MMHG | HEART RATE: 82 BPM | HEIGHT: 63 IN

## 2023-12-12 DIAGNOSIS — D50.0 IRON DEFICIENCY ANEMIA DUE TO CHRONIC BLOOD LOSS: Primary | ICD-10-CM

## 2023-12-12 PROCEDURE — 96374 THER/PROPH/DIAG INJ IV PUSH: CPT

## 2023-12-12 PROCEDURE — 25010000002 IRON SUCROSE PER 1 MG: Performed by: NURSE PRACTITIONER

## 2023-12-12 PROCEDURE — 25010000002 DIPHENHYDRAMINE PER 50 MG: Performed by: NURSE PRACTITIONER

## 2023-12-12 PROCEDURE — 25010000002 HYDROCORTISONE SOD SUC (PF) 100 MG RECONSTITUTED SOLUTION: Performed by: NURSE PRACTITIONER

## 2023-12-12 PROCEDURE — 25810000003 SODIUM CHLORIDE 0.9 % SOLUTION: Performed by: NURSE PRACTITIONER

## 2023-12-12 PROCEDURE — 96375 TX/PRO/DX INJ NEW DRUG ADDON: CPT

## 2023-12-12 RX ORDER — SODIUM CHLORIDE 9 MG/ML
20 INJECTION, SOLUTION INTRAVENOUS ONCE
Status: COMPLETED | OUTPATIENT
Start: 2023-12-12 | End: 2023-12-12

## 2023-12-12 RX ORDER — ACETAMINOPHEN 325 MG/1
650 TABLET ORAL ONCE
Status: COMPLETED | OUTPATIENT
Start: 2023-12-12 | End: 2023-12-12

## 2023-12-12 RX ORDER — FAMOTIDINE 10 MG/ML
20 INJECTION, SOLUTION INTRAVENOUS ONCE
Status: COMPLETED | OUTPATIENT
Start: 2023-12-12 | End: 2023-12-12

## 2023-12-12 RX ADMIN — IRON SUCROSE 200 MG: 20 INJECTION, SOLUTION INTRAVENOUS at 09:49

## 2023-12-12 RX ADMIN — DIPHENHYDRAMINE HYDROCHLORIDE 25 MG: 50 INJECTION, SOLUTION INTRAMUSCULAR; INTRAVENOUS at 09:22

## 2023-12-12 RX ADMIN — SODIUM CHLORIDE 20 ML/HR: 9 INJECTION, SOLUTION INTRAVENOUS at 09:10

## 2023-12-12 RX ADMIN — FAMOTIDINE 20 MG: 10 INJECTION INTRAVENOUS at 09:19

## 2023-12-12 RX ADMIN — ACETAMINOPHEN 650 MG: 325 TABLET ORAL at 09:15

## 2023-12-12 RX ADMIN — HYDROCORTISONE SODIUM SUCCINATE 40 MG: 100 INJECTION, POWDER, FOR SOLUTION INTRAMUSCULAR; INTRAVENOUS at 09:16

## 2023-12-14 ENCOUNTER — INFUSION (OUTPATIENT)
Dept: ONCOLOGY | Facility: HOSPITAL | Age: 33
End: 2023-12-14
Payer: COMMERCIAL

## 2023-12-14 VITALS
RESPIRATION RATE: 15 BRPM | HEART RATE: 78 BPM | BODY MASS INDEX: 22.22 KG/M2 | DIASTOLIC BLOOD PRESSURE: 70 MMHG | TEMPERATURE: 98.6 F | WEIGHT: 125.4 LBS | HEIGHT: 63 IN | OXYGEN SATURATION: 99 % | SYSTOLIC BLOOD PRESSURE: 108 MMHG

## 2023-12-14 DIAGNOSIS — D50.0 IRON DEFICIENCY ANEMIA DUE TO CHRONIC BLOOD LOSS: Primary | ICD-10-CM

## 2023-12-14 PROCEDURE — 25010000002 DIPHENHYDRAMINE PER 50 MG: Performed by: NURSE PRACTITIONER

## 2023-12-14 PROCEDURE — 25010000002 HYDROCORTISONE SOD SUC (PF) 100 MG RECONSTITUTED SOLUTION: Performed by: NURSE PRACTITIONER

## 2023-12-14 PROCEDURE — 96375 TX/PRO/DX INJ NEW DRUG ADDON: CPT

## 2023-12-14 PROCEDURE — 25010000002 IRON SUCROSE PER 1 MG: Performed by: NURSE PRACTITIONER

## 2023-12-14 PROCEDURE — 25810000003 SODIUM CHLORIDE 0.9 % SOLUTION: Performed by: NURSE PRACTITIONER

## 2023-12-14 PROCEDURE — 96374 THER/PROPH/DIAG INJ IV PUSH: CPT

## 2023-12-14 RX ORDER — FAMOTIDINE 10 MG/ML
20 INJECTION, SOLUTION INTRAVENOUS ONCE
Status: COMPLETED | OUTPATIENT
Start: 2023-12-14 | End: 2023-12-14

## 2023-12-14 RX ORDER — ACETAMINOPHEN 325 MG/1
650 TABLET ORAL ONCE
Status: COMPLETED | OUTPATIENT
Start: 2023-12-14 | End: 2023-12-14

## 2023-12-14 RX ORDER — SODIUM CHLORIDE 9 MG/ML
20 INJECTION, SOLUTION INTRAVENOUS ONCE
Status: COMPLETED | OUTPATIENT
Start: 2023-12-14 | End: 2023-12-14

## 2023-12-14 RX ADMIN — HYDROCORTISONE SODIUM SUCCINATE 40 MG: 100 INJECTION, POWDER, FOR SOLUTION INTRAMUSCULAR; INTRAVENOUS at 09:24

## 2023-12-14 RX ADMIN — IRON SUCROSE 200 MG: 20 INJECTION, SOLUTION INTRAVENOUS at 10:00

## 2023-12-14 RX ADMIN — DIPHENHYDRAMINE HYDROCHLORIDE 25 MG: 50 INJECTION, SOLUTION INTRAMUSCULAR; INTRAVENOUS at 09:29

## 2023-12-14 RX ADMIN — SODIUM CHLORIDE 20 ML/HR: 9 INJECTION, SOLUTION INTRAVENOUS at 09:20

## 2023-12-14 RX ADMIN — ACETAMINOPHEN 650 MG: 325 TABLET ORAL at 09:24

## 2023-12-14 RX ADMIN — FAMOTIDINE 20 MG: 10 INJECTION INTRAVENOUS at 09:26

## 2023-12-19 ENCOUNTER — TELEPHONE (OUTPATIENT)
Dept: GASTROENTEROLOGY | Facility: CLINIC | Age: 33
End: 2023-12-19
Payer: COMMERCIAL

## 2023-12-19 ENCOUNTER — OFFICE VISIT (OUTPATIENT)
Dept: GASTROENTEROLOGY | Facility: CLINIC | Age: 33
End: 2023-12-19
Payer: COMMERCIAL

## 2023-12-19 VITALS
TEMPERATURE: 96.8 F | BODY MASS INDEX: 22.5 KG/M2 | SYSTOLIC BLOOD PRESSURE: 112 MMHG | OXYGEN SATURATION: 97 % | HEART RATE: 91 BPM | WEIGHT: 127 LBS | HEIGHT: 63 IN | DIASTOLIC BLOOD PRESSURE: 74 MMHG

## 2023-12-19 DIAGNOSIS — K51.011 ULCERATIVE PANCOLITIS WITH RECTAL BLEEDING: Primary | ICD-10-CM

## 2023-12-19 PROCEDURE — 1160F RVW MEDS BY RX/DR IN RCRD: CPT | Performed by: INTERNAL MEDICINE

## 2023-12-19 PROCEDURE — 1159F MED LIST DOCD IN RCRD: CPT | Performed by: INTERNAL MEDICINE

## 2023-12-19 PROCEDURE — 99214 OFFICE O/P EST MOD 30 MIN: CPT | Performed by: INTERNAL MEDICINE

## 2023-12-19 RX ORDER — HYDROCORTISONE ACETATE 25 MG/1
25 SUPPOSITORY RECTAL 2 TIMES DAILY
Qty: 20 SUPPOSITORY | Refills: 2 | Status: SHIPPED | OUTPATIENT
Start: 2023-12-19

## 2023-12-19 NOTE — TELEPHONE ENCOUNTER
----- Message from Jovanni VALLADARES MD sent at 12/19/2023  9:57 AM EST -----  Please call Rx for Anusol HC suppository or equivalent to be taken twice daily #20 with 2 refills

## 2023-12-19 NOTE — PROGRESS NOTES
Chief Complaint   Patient presents with    Ulcerative Colitis    Rectal Bleeding    Diarrhea    Abdominal Cramping        Magdy Guerrero is a  33 y.o. male here for a follow up visit for ulcerative pancolitis    HPI this 33-year-old  male patient of MAYITO Copeland presents in follow-up since last seen September of this year.  He has been treated with Humira that started on October 31 and has noted no further rectal bleeding but still have episodes of bowel urgency with occasional inability to reach the restroom and a feeling of incomplete evacuation which is normally associated with excess gas.  He does not complain of some rectal pain post defecation.  We talked about continuing to taper his prednisone slowly as the last time he stopped it he had a rebound effect.  He does complain of a skin rash but no pruritus.  This seems to coincide with adjustment of his prednisone.  I advised we would try a hydrocortisone suppository to see if this affords symptomatic relief and also encouraged use of activated charcoal to address gas problems.  He is to call with a progress report in 2 to 3 weeks time.  If his symptoms do not improve we may need to consider alternative biologic agent for management.    Past Medical History:   Diagnosis Date    History of iron deficiency anemia     Ulcerative colitis 07/02/2023       Current Outpatient Medications   Medication Sig Dispense Refill    Adalimumab (Humira Pen) 40 MG/0.4ML Pen-injector Kit Inject 40 mg under the skin into the appropriate area as directed Every 14 (Fourteen) Days. 2 each 10    omeprazole (priLOSEC) 40 MG capsule Take 1 capsule by mouth Daily. 90 capsule 1    predniSONE (DELTASONE) 10 MG tablet Take 4 tablets by mouth Daily for 7 days, THEN 3.5 tablets Daily for 7 days, THEN 3 tablets Daily for 7 days, THEN 2 tablets Daily for 7 days, THEN 1 tablet Daily for 7 days. 95 tablet 0    Adalimumab (Humira Pen-CD/UC/HS Starter) 80 MG/0.8ML injection Inject 160  mg (2 pens) subcutaneously on day 1 and 80 mg (1 pen) subcutaneously on day 15. Start maintenance dose on day 29. (Patient not taking: Reported on 12/19/2023) 3 each 0    hydrocortisone 2.5 % cream Apply 1 application  topically to the appropriate area as directed 2 (Two) Times a Day. (Patient not taking: Reported on 12/19/2023) 30 g 1     No current facility-administered medications for this visit.       PRN Meds:.    No Known Allergies    Social History     Socioeconomic History    Marital status: Single   Tobacco Use    Smoking status: Former     Packs/day: 0.50     Years: 1.00     Additional pack years: 0.00     Total pack years: 0.50     Types: Cigarettes     Quit date: 1/1/2020     Years since quitting: 3.9     Passive exposure: Past    Smokeless tobacco: Never   Vaping Use    Vaping Use: Never used   Substance and Sexual Activity    Alcohol use: Never    Drug use: Yes     Types: Marijuana     Comment: once a day    Sexual activity: Defer       Family History   Problem Relation Age of Onset    Cirrhosis Mother        Review of Systems   Constitutional:  Negative for activity change, appetite change, fatigue and unexpected weight change.   HENT:  Negative for congestion, facial swelling, sore throat, trouble swallowing and voice change.    Eyes:  Negative for photophobia and visual disturbance.   Respiratory:  Negative for cough and choking.    Cardiovascular:  Negative for chest pain.   Gastrointestinal:  Negative for abdominal distention, abdominal pain, anal bleeding, blood in stool, constipation, diarrhea, nausea, rectal pain and vomiting.        Increased bowel frequency  Incomplete evacuation  Rectal pain   Endocrine: Negative for polyphagia.   Musculoskeletal:  Negative for arthralgias, gait problem and joint swelling.   Skin:  Negative for color change, pallor and rash.   Allergic/Immunologic: Negative for food allergies.   Neurological:  Negative for speech difficulty and headaches.   Hematological:   Does not bruise/bleed easily.   Psychiatric/Behavioral:  Negative for agitation, confusion and sleep disturbance.        Vitals:    12/19/23 0902   BP: 112/74   Pulse: 91   Temp: 96.8 °F (36 °C)   SpO2: 97%       Physical Exam  Constitutional:       Appearance: He is well-developed.   HENT:      Head: Normocephalic.   Eyes:      Conjunctiva/sclera: Conjunctivae normal.   Cardiovascular:      Rate and Rhythm: Normal rate and regular rhythm.   Pulmonary:      Breath sounds: Normal breath sounds.   Abdominal:      General: Bowel sounds are normal.      Palpations: Abdomen is soft.   Musculoskeletal:         General: Normal range of motion.      Cervical back: Normal range of motion.   Skin:     General: Skin is warm and dry.   Neurological:      Mental Status: He is alert and oriented to person, place, and time.   Psychiatric:         Behavior: Behavior normal.         ASSESSMENT   #1 ulcerative pancolitis: Some response to biologic agent and corticosteroid but still having symptoms including increased bowel frequency with excess gas passage and rectal pain      PLAN  Continue Humira  Continue corticosteroids taper  Consider activated charcoal to address excess gas  Trial of Anusol HC suppository to help alleviate rectal discomfort  Patient to call with a progress report in 2 to 3 weeks time.  If symptoms persist may need to consider alternative biologic agent      ICD-10-CM ICD-9-CM   1. Ulcerative pancolitis with rectal bleeding  K51.011 556.6

## 2023-12-21 ENCOUNTER — INFUSION (OUTPATIENT)
Dept: ONCOLOGY | Facility: HOSPITAL | Age: 33
End: 2023-12-21
Payer: COMMERCIAL

## 2023-12-21 VITALS — HEART RATE: 91 BPM | DIASTOLIC BLOOD PRESSURE: 68 MMHG | OXYGEN SATURATION: 99 % | SYSTOLIC BLOOD PRESSURE: 114 MMHG

## 2023-12-21 DIAGNOSIS — D50.0 IRON DEFICIENCY ANEMIA DUE TO CHRONIC BLOOD LOSS: Primary | ICD-10-CM

## 2023-12-21 PROCEDURE — 25010000002 DIPHENHYDRAMINE PER 50 MG: Performed by: INTERNAL MEDICINE

## 2023-12-21 PROCEDURE — 96375 TX/PRO/DX INJ NEW DRUG ADDON: CPT

## 2023-12-21 PROCEDURE — 96374 THER/PROPH/DIAG INJ IV PUSH: CPT

## 2023-12-21 PROCEDURE — 25810000003 SODIUM CHLORIDE 0.9 % SOLUTION: Performed by: INTERNAL MEDICINE

## 2023-12-21 PROCEDURE — 25010000002 IRON SUCROSE PER 1 MG: Performed by: INTERNAL MEDICINE

## 2023-12-21 PROCEDURE — 25010000002 HYDROCORTISONE SOD SUC (PF) 100 MG RECONSTITUTED SOLUTION: Performed by: INTERNAL MEDICINE

## 2023-12-21 RX ORDER — FAMOTIDINE 10 MG/ML
20 INJECTION, SOLUTION INTRAVENOUS ONCE
Status: COMPLETED | OUTPATIENT
Start: 2023-12-21 | End: 2023-12-21

## 2023-12-21 RX ORDER — ACETAMINOPHEN 325 MG/1
650 TABLET ORAL ONCE
Status: COMPLETED | OUTPATIENT
Start: 2023-12-21 | End: 2023-12-21

## 2023-12-21 RX ORDER — SODIUM CHLORIDE 9 MG/ML
20 INJECTION, SOLUTION INTRAVENOUS ONCE
Status: COMPLETED | OUTPATIENT
Start: 2023-12-21 | End: 2023-12-21

## 2023-12-21 RX ORDER — DIPHENHYDRAMINE HYDROCHLORIDE 50 MG/ML
25 INJECTION INTRAMUSCULAR; INTRAVENOUS ONCE
Status: COMPLETED | OUTPATIENT
Start: 2023-12-21 | End: 2023-12-21

## 2023-12-21 RX ADMIN — SODIUM CHLORIDE 20 ML/HR: 9 INJECTION, SOLUTION INTRAVENOUS at 09:28

## 2023-12-21 RX ADMIN — DIPHENHYDRAMINE HYDROCHLORIDE 25 MG: 50 INJECTION INTRAMUSCULAR; INTRAVENOUS at 09:28

## 2023-12-21 RX ADMIN — IRON SUCROSE 200 MG: 20 INJECTION, SOLUTION INTRAVENOUS at 09:56

## 2023-12-21 RX ADMIN — FAMOTIDINE 20 MG: 10 INJECTION INTRAVENOUS at 09:29

## 2023-12-21 RX ADMIN — HYDROCORTISONE SODIUM SUCCINATE 40 MG: 100 INJECTION, POWDER, FOR SOLUTION INTRAMUSCULAR; INTRAVENOUS at 09:31

## 2023-12-21 RX ADMIN — ACETAMINOPHEN 650 MG: 325 TABLET ORAL at 09:27

## 2024-01-03 ENCOUNTER — SPECIALTY PHARMACY (OUTPATIENT)
Dept: GASTROENTEROLOGY | Facility: CLINIC | Age: 34
End: 2024-01-03
Payer: COMMERCIAL

## 2024-01-03 NOTE — PROGRESS NOTES
Specialty Pharmacy Refill Coordination Note     Magdy is a 33 y.o. male contacted today regarding refills of  1 specialty medication(s).    Reviewed and verified with patient: humira    Specialty medication(s) and dose(s) confirmed: yes    Refill Questions      Flowsheet Row Most Recent Value   Changes to allergies? No   Changes to medications? No   New conditions or infections since last clinic visit No   Unplanned office visit, urgent care, ED, or hospital admission in the last 4 weeks  No   How does patient/caregiver feel medication is working? Good   Financial problems or insurance changes  No   Since the previous refill, were any specialty medication doses or scheduled injections missed or delayed?  No   Does this patient require a clinical escalation to a pharmacist? No            Delivery Questions      Flowsheet Row Most Recent Value   Delivery method Beeline   Delivery address verified with patient/caregiver? Yes   Delivery address Home   Number of medications in delivery 1   Medication(s) being filled and delivered Adalimumab   Doses left of specialty medications 1 week   Copay verified? Yes   Copay amount 0   Copay form of payment No copayment ($0)                   Follow-up: 21 day(s)     Ela Quiroga  Specialty Pharmacy Technician

## 2024-01-11 RX ORDER — OMEPRAZOLE 40 MG/1
40 CAPSULE, DELAYED RELEASE ORAL DAILY
Qty: 90 CAPSULE | Refills: 1 | Status: SHIPPED | OUTPATIENT
Start: 2024-01-11

## 2024-01-16 ENCOUNTER — TELEPHONE (OUTPATIENT)
Dept: GASTROENTEROLOGY | Facility: CLINIC | Age: 34
End: 2024-01-16
Payer: COMMERCIAL

## 2024-01-16 NOTE — TELEPHONE ENCOUNTER
Can start on prednisone 40 mg daily for 7 days and then taper by 10 mg increments every week until he reaches a 10 mg dose and then taper by 5 mg every week until done       Called Ward  at [phone] and left vm for him to call back.

## 2024-01-16 NOTE — TELEPHONE ENCOUNTER
With recurrence of symptoms after tapering steroid, may need to resume corticosteroid with slow taper and also schedule follow-up with myself or nurse practitioner to discuss alternative treatment options such as biologic agents. Per DR Carrera       Message sent to Dr Carrera for clarification on where to start back on steroid and dosing instructions.

## 2024-01-17 NOTE — TELEPHONE ENCOUNTER
I am not really sure what he means about the pimples all over his face.  It is safe for him to continue to take the prednisone as we will titrate it down slowly as advised previously.  I recommend you all schedule a follow-up with Dr. Carrera at the end of this month or early February to discuss alternative therapies potentially.

## 2024-01-17 NOTE — TELEPHONE ENCOUNTER
My chart message from pt;  Ok, but I've been taking prednisone for a long time. and I don't want it to end up hurting me, the last time I climbed the docis I got pimples all over my face and body.

## 2024-01-18 ENCOUNTER — SPECIALTY PHARMACY (OUTPATIENT)
Dept: GASTROENTEROLOGY | Facility: CLINIC | Age: 34
End: 2024-01-18
Payer: COMMERCIAL

## 2024-01-18 ENCOUNTER — OFFICE VISIT (OUTPATIENT)
Dept: GASTROENTEROLOGY | Facility: CLINIC | Age: 34
End: 2024-01-18
Payer: COMMERCIAL

## 2024-01-18 VITALS
WEIGHT: 123.2 LBS | TEMPERATURE: 97 F | SYSTOLIC BLOOD PRESSURE: 135 MMHG | HEART RATE: 120 BPM | BODY MASS INDEX: 21.83 KG/M2 | HEIGHT: 63 IN | DIASTOLIC BLOOD PRESSURE: 83 MMHG

## 2024-01-18 DIAGNOSIS — K51.011 ULCERATIVE PANCOLITIS WITH RECTAL BLEEDING: Primary | ICD-10-CM

## 2024-01-18 RX ORDER — UPADACITINIB 30 MG/1
30 TABLET, EXTENDED RELEASE ORAL DAILY
Qty: 30 TABLET | Refills: 10 | Status: SHIPPED | OUTPATIENT
Start: 2024-01-18

## 2024-01-18 RX ORDER — UPADACITINIB 45 MG/1
45 TABLET, EXTENDED RELEASE ORAL DAILY
Qty: 28 TABLET | Refills: 1 | Status: SHIPPED | OUTPATIENT
Start: 2024-01-18

## 2024-01-18 RX ORDER — ACETAMINOPHEN 325 MG/1
650 TABLET ORAL ONCE
Status: CANCELLED | OUTPATIENT
Start: 2024-01-18

## 2024-01-18 RX ORDER — MESALAMINE 1000 MG/1
1000 SUPPOSITORY RECTAL NIGHTLY
Qty: 42 SUPPOSITORY | Refills: 2 | Status: SHIPPED | OUTPATIENT
Start: 2024-01-18

## 2024-01-18 RX ORDER — DIPHENHYDRAMINE HCL 25 MG
25 CAPSULE ORAL ONCE
Status: CANCELLED | OUTPATIENT
Start: 2024-01-18

## 2024-01-18 RX ORDER — PREDNISONE 10 MG/1
TABLET ORAL
Qty: 49 TABLET | Refills: 0 | Status: SHIPPED | OUTPATIENT
Start: 2024-01-18 | End: 2024-02-22

## 2024-01-18 NOTE — PROGRESS NOTES
Chief Complaint  Ulcerative pancolitis with rectal bleeding, Abdominal Pain, Diarrhea, and Rectal Bleeding    Subjective          History Of Present Illness:    Magdy Guerrero is a [place]ian speaking 33 y.o. male of Dr. Carrera's with a history of ulcerative pancolitis.  Patient's Crohn's disease was diagnosed during hospitalization of June 2023. Patient is new to me.    Patient was joined by his partner on this visit who provided translation and to provide his medical history.  Patient has been on and off steroids since his diagnosis in June.  During his hospitalization he failed oral mesalamine and was discharged home on oral prednisone and nightly mesalamine.  On initial discussion of biologic therapy Stelara was considered and unfortunately not initially covered by his insurance.  Therefore patient was initiated on Humira and has since then had a poor response. He has received a total of 6 infusions with his last one being January 6, 2024. Patient has continued to have breakthrough symptoms upon bring down his steroids. Patient was most recently down to 5 mg and began having increased frequency of his diarrhea, as well as recurrence of rectal bleeding, abdominal pain.  Patient was offered hydrocortisone suppositories on his last visit but unfortunately was not able to afford these. Patients weight has fluctuated and is currently at 123 lbs.     Patient reached out a few days ago with ongoing symptoms and Dr. Carrera had recommended patient restart a prednisone taper at 40 mg. Unfortunately patient has a hard time tolerating high-dose prednisone and has dermatologic side effects including rash and acne vulgaris.    Patient has seen Dr. Nye with hematology and has been receiving IV iron infusions with relatively good response especially in the setting of his ongoing rectal bleeding.  This recent hemoglobin has been stable at 12.5. Patient still noted to have low ferritin and iron levels.    Objective   Vital  "Signs:   /83   Pulse 120   Temp 97 °F (36.1 °C)   Ht 160 cm (63\")   Wt 55.9 kg (123 lb 3.2 oz)   BMI 21.82 kg/m²       Physical Exam  Vitals reviewed.   Constitutional:       General: He is not in acute distress.     Appearance: Normal appearance. He is not ill-appearing.   HENT:      Head: Normocephalic and atraumatic.      Nose: Nose normal.      Mouth/Throat:      Pharynx: Oropharynx is clear.   Eyes:      Extraocular Movements: Extraocular movements intact.      Conjunctiva/sclera: Conjunctivae normal.      Pupils: Pupils are equal, round, and reactive to light.   Pulmonary:      Effort: Pulmonary effort is normal.   Abdominal:      General: Abdomen is flat. Bowel sounds are increased. There is no distension.      Palpations: Abdomen is soft. There is no mass.      Tenderness: There is abdominal tenderness in the right lower quadrant and left lower quadrant.   Musculoskeletal:         General: No swelling. Normal range of motion.      Cervical back: Normal range of motion.   Skin:     General: Skin is warm and dry.      Findings: No bruising or rash.   Neurological:      General: No focal deficit present.      Mental Status: He is alert and oriented to person, place, and time.      Motor: No weakness.      Gait: Gait normal.   Psychiatric:         Mood and Affect: Mood normal.          Result Review :   The following data was reviewed by: Tereza Wallis PA-C on 01/18/2024:  CMP          7/21/2023    10:50 8/18/2023    10:55 10/19/2023    15:39   CMP   Glucose 98  95  98    BUN 10  8  6    Creatinine 0.70  0.85  0.69    EGFR 126.3      Sodium 138  137  139    Potassium 4.2  4.0  3.9    Chloride 105  98  103    Calcium 9.3  9.3  9.0    Total Protein  6.2  7.0    Total Protein 6.3      Albumin 4.2  3.7  3.7    Globulin  2.5  3.3    Globulin 2.1      Total Bilirubin 0.4  <0.2  <0.2    Alkaline Phosphatase 71  67  158    AST (SGOT) 25  10  15    ALT (SGPT) 54  13  18    Albumin/Globulin Ratio 2.0    "   BUN/Creatinine Ratio 14.3  9.4  9    Anion Gap 9.0        CBC          10/26/2023    09:50 11/21/2023    10:00 12/1/2023    10:35   CBC   WBC 8.20  7.25  8.58    RBC 3.90  4.58  4.77    Hemoglobin 8.8  12.2  12.5    Hematocrit 29.1  38.1  39.9    MCV 74.6  83.2  83.6    MCH 22.6  26.6  26.2    MCHC 30.2  32.0  31.3    RDW 16.9  24.0  23.5    Platelets 499  338  326            Assessment and Plan    Diagnoses and all orders for this visit:    1. Ulcerative pancolitis with rectal bleeding (Primary)  -     predniSONE (DELTASONE) 10 MG tablet; Take 2 tablets by mouth Daily for 14 days, THEN 1 tablet Daily for 21 days.  Dispense: 49 tablet; Refill: 0  -     Adalimumab+Ab (Serial Monitor); Future  -     mesalamine (CANASA) 1000 MG suppository; Insert 1 suppository into the rectum Every Night.  Dispense: 42 suppository; Refill: 2  -     Hepatitis B Surface Antigen; Future  -     QuantiFERON TB Gold; Future    Other orders  -     Zoster Vac Recomb Adjuvanted 50 MCG/0.5ML reconstituted suspension; Inject 0.5 mL into the appropriate muscle as directed by prescriber 1 (One) Time for 1 dose. Repeat second dose after at least 2 months.  Dispense: 1 each; Refill: 1       Patient currently with ongoing uncontrolled ulcerative pancolitis with rectal bleeding, diarrhea, abdominal pain. Patient has failed taper off his steroids multiple times at this point.  Patient additionally has side effects at higher doses of steroids.  Will initiate him back on 20 mg of prednisone for the next 14 days and taper down to 10 mg daily thereafter until we get him established on new biologic therapy.  At this point patient the patient has had six Humira injections with minimal response while on adjunct steroid therapy. Will plan to check antibodies prior to his next injection next week.   Will tentatively plan to switch patient to Rinvoq as it has shown to be effective in refractory moderate to severe ulcerative colitis.  Most recent lipid panel  reviewed from 8/18/2023 which was within normal limits.  Patient will need Shingrix vaccine prior to proceeding with Rinvoq therapy.   Patient to update labs next week including hepatitis B surface antigen and TB Gold.  Will trial a course of Canasa suppositories for rectal bleeding and discomfort in the interim.  Ideally as we initiate on Stelara and this begins to work we can stop the suppositories.     Follow Up   Return in about 6 weeks (around 2/29/2024) for Tereza Pickett PA-C.    Dragon dictation used throughout this note.     I spent 40 minutes caring for Mr. Guerrero on this date of service. This time includes time spent by me in the following activities: preparing for the visit, reviewing tests, obtaining and/or reviewing a separately obtained history, performing a medically appropriate examination and/or evaluation , counseling and educating the patient/family/caregiver, ordering medications, tests, or procedures, documenting information in the medical record, independently interpreting results and communicating that information with the patient/family/caregiver and care coordination.            Tereza Pickett PA-C   [facility]  [address] [place] [address]  Office: [phone]

## 2024-01-18 NOTE — PROGRESS NOTES
Per D/W SERGIO iPckett, pt not improving on Humira and unable to wean off steroids. Initially discussed Stelara based on options on patient's insurance, but Rinvoq is also on formulary with non-preferred status (same as Stelara). Will try Rinvoq approval first - patient will need Shingrix first dose prior to starting but all other labs are UTD/already ordered.  Erin Hilton, PharmD, BCACP, BCPS, BCCCP

## 2024-01-18 NOTE — PROGRESS NOTES
Specialty Pharmacy Patient Management Program  Gastroenterology Initial Assessment     Magdy Guerrero is a 33 y.o. male seen by a Gastroenterology provider for Ulcerative Colitis and enrolled in the Patient Management program offered by [facility]. An initial outreach was conducted, including assessment of therapy appropriateness and specialty medication education for Rinvoq (upadacitinib). The patient was introduced to services offered by [facility], including: regular assessments, refill coordination, curbside pick-up or mail order delivery options, prior authorization maintenance, and financial assistance programs as applicable. The patient was also provided with contact information for the pharmacy team.     Insurance Coverage & Financial Support  Prior authorization approved' $0 copay      Relevant Past Medical History and Comorbidities  Relevant medical history and concomitant health conditions were discussed with the patient. The patient's chart has been reviewed for relevant past medical history and comorbid health conditions and updated as necessary.   Past Medical History:   Diagnosis Date    History of iron deficiency anemia     Ulcerative colitis 2023     Social History     Socioeconomic History    Marital status: Single   Tobacco Use    Smoking status: Former     Packs/day: 0.50     Years: 1.00     Additional pack years: 0.00     Total pack years: 0.50     Types: Cigarettes     Quit date: 2020     Years since quittin.0     Passive exposure: Past    Smokeless tobacco: Never   Vaping Use    Vaping Use: Never used   Substance and Sexual Activity    Alcohol use: Never    Drug use: Yes     Types: Marijuana     Comment: once a day    Sexual activity: Defer     Problem list reviewed by Erin Hilton, PharmD on 2024 at  3:42 PM    Allergies  Known allergies and reactions were discussed with the patient. The patient's chart has been reviewed  for allergy information and updated as necessary.   Patient has no known allergies.  Allergies reviewed by Erin Hilton, PharmD on 1/18/2024 at  3:42 PM    Current Medication List  This medication list has been reviewed with the patient and evaluated for any interactions or necessary modifications/recommendations, and updated to include all prescription medications, OTC medications, and supplements the patient is currently taking. This list reflects what is contained in the patient's profile, which has also been marked as reviewed to communicate to other providers it is the most up to date version of the patient's current medication therapy.     Current Outpatient Medications:     mesalamine (CANASA) 1000 MG suppository, Insert 1 suppository into the rectum Every Night., Disp: 42 suppository, Rfl: 2    omeprazole (priLOSEC) 40 MG capsule, Take 1 capsule by mouth Daily., Disp: 90 capsule, Rfl: 1    predniSONE (DELTASONE) 10 MG tablet, Take 2 tablets by mouth Daily for 14 days, THEN 1 tablet Daily for 21 days., Disp: 49 tablet, Rfl: 0    Upadacitinib ER (Rinvoq) 30 MG tablet sustained-release 24 hour, Take 1 tablet by mouth Daily., Disp: 30 tablet, Rfl: 10    upadacitinib ER (Rinvoq) 45 MG tablet sustained-release 24 hour extended release tablet, Take 1 tablet by mouth Daily., Disp: 28 tablet, Rfl: 1    Zoster Vac Recomb Adjuvanted 50 MCG/0.5ML reconstituted suspension, Inject 0.5 mL into the appropriate muscle as directed by prescriber 1 (One) Time for 1 dose. Repeat second dose after at least 2 months., Disp: 1 each, Rfl: 1  No current facility-administered medications for this visit.  Medicines reviewed by Erin Hilton, PharmD on 1/18/2024 at  3:42 PM    Drug Interactions  none     Relevant Laboratory Values  Lab Results   Component Value Date    GLUCOSE 98 10/19/2023    BUN 6 10/19/2023    CREATININE 0.69 (L) 10/19/2023    BCR 9 10/19/2023     10/19/2023    K 3.9 10/19/2023     10/19/2023    CO2  21 10/19/2023    CALCIUM 9.0 10/19/2023    PROTEINTOT 6.3 07/21/2023    ALBUMIN 3.7 (L) 10/19/2023    ALT 18 10/19/2023    AST 15 10/19/2023    ALKPHOS 158 (H) 10/19/2023    BILITOT <0.2 10/19/2023    ANIONGAP 9.0 07/21/2023    PHOS 4.2 07/02/2023    MG 2.0 07/02/2023    EGFRRESULT 127 10/19/2023     Lab Results   Component Value Date    WBC 8.58 12/01/2023    HGB 12.5 (L) 12/01/2023    HCT 39.9 12/01/2023     12/01/2023     Lab Results   Component Value Date    HAV Positive (A) 07/27/2023    HEPAIGM Non-Reactive 06/27/2023    HEPBIGMCORE Non-Reactive 06/27/2023    HEPBSAB Reactive 07/27/2023    HEPBSAG Non-Reactive 06/27/2023    HEPCVIRUSABY Non-Reactive 06/27/2023     Lab Results   Component Value Date    QUANTITBGLDP Negative 07/27/2023     Lab Value Review  The above lab values have been reviewed; the following specialty medication(s) dose adjustment(s) are recommended: none.    Initial Education Provided for Specialty Medication  The patient has been provided with the following education and any applicable administration techniques (i.e. self-injection) have been demonstrated for the therapies indicated. All questions and concerns have been addressed prior to the patient receiving the medication, and the patient has verbalized understanding of the education and any materials provided. Additional patient education shall be provided and documented upon request by the patient, provider, or payer.         Rinvoq (upadacitinib)         Medication Expectations   Why am I taking this medication? You are taking this medication for psoriatic arthritis, rheumatoid arthritis, atopic dermatitis, ankylosing spondylitis, or ulcerative colitis.   What should I expect while on this medication? You should expect a decrease in the frequency and severity of symptoms.   How does the medication work? Upadacitinib inhibits AUDI enzymes, which inhibits immune cell function. This prevents cytokines from being released, which  helps decrease inflammation.   How long will I be on this medication for? The amount of time you will be on this medication will be determined by your doctor and your response to the medication.    How do I take this medication? Take as directed on your prescription label. This medication may be taken with or without food. Tablets should be taken whole; do not crush, split, or chew.    What are some possible side effects? Hypersensitivity reactions, nausea, upper respiratory tract infection, signs of a common cold, acne, or lab abnormalities.   What happens if I miss a dose? If you miss a dose, take it as soon as you remember. If it is close to the time for your next dose, skip the missed dose and go back to your normal time.             Medication Safety   What are things I should warn my doctor immediately about? Allergic reaction such as hives or trouble breathing. If you develop symptoms of infection such as a fever or cough that does not go away, shingles, swollen glands, night sweats, skin changes such as lumps/growths or change in color or size of a mole, muscle pain or weakness, stomach pain that is new or worse, or chest pain.    What are things that I should be cautious of? Signs of a cold or upset stomach. You may have more chance of getting an infection.  Wash your hands often and stay away from people with infections, colds, or flu.   What are some medications that can interact with this one? Immunosuppressants and vaccines.            Medication Storage/Handling   How should I handle this medication? Keep this medication our of reach of pets/children in original container.  Store in the original container to protect from moisture. Wash your hands after handling. Do not handle if pregnant.   How does this medication need to be stored? Store at room temperature in original container.   How should I dispose of this medication? Take to your local police station for proper disposal or some pharmacies also  have take-back bins for medication drop-off.             Resources/Support   How can I remind myself to take this medication? You can use a pill box, download a reminder jean-pierre on your phone, or use a calandar to help remember to take your medication.   Is financial support available?  Yes, Rinvoq [employer] can provide co-pay assistance if you have commercial insurance or [employer] can help with patient assistance if you have Medicare or no insurance.    Which vaccines are recommended for me? Talk to your doctor about these vaccines: Flu, Coronavirus (COVID-19), Pneumococcal (pneumonia), Tdap, Hepatitis B, Zoster (shingles).            Adherence and Self-Administration  Barriers to Patient Adherence and/or Self-Administration: none   Methods for Supporting Patient Adherence and/or Self-Administration: N/A     Goals of Therapy   Goals Addressed Today        Specialty Pharmacy General Goal      At least 50% symptom reduction and mucosal healing               Reassessment Plan & Follow-Up  Medication Therapy Changes: stop Humira. Start Rinvoq when received on 1/23 as long as first dose of Shingrix has been received.  Additional Plans, Therapy Recommendations, or Therapy Problems to Be Addressed:  Shingrix prescription sent to [facility] - obtain first dose prior to starting Rinvoq.    Pharmacist to perform regular reassessments no more than (6) months from the previous assessment.  Welcome information and patient satisfaction survey to be sent by retail team with patient's initial fill.  Care Coordinator to set up future refill outreaches, coordinate prescription delivery, and escalate clinical questions to pharmacist.     Attestation  Therapeutic appropriateness: Appropriate   I attest the patient was actively involved in and has agreed to the above plan of care. I attest that the initiated specialty medication(s) are appropriate for the patient based on my assessment. If the prescribed therapy is at any point deemed not  appropriate based on the current or future assessments, a consultation will be initiated with the patient's specialty care provider to determine the best course of action. The revised plan of therapy will be documented along with any required assessments and/or additional patient education provided.     Erin Hilton, PharmD, BCACP, BCPS, BCCCP  Clinical Specialty Pharmacist, Gastroenterology  01/18/24 15:44 EST

## 2024-01-18 NOTE — Clinical Note
As we talked about I placed the Shingrix order.  Let me know if you have any issues getting hold of him to talk about Rinvoq and I will call him tomorrow.

## 2024-01-18 NOTE — PROGRESS NOTES
Specialty Pharmacy     Rinvoq PA approved  1/18/24 to 7/17/24       Ela Quiroga  Specialty Pharmacy Technician

## 2024-01-22 ENCOUNTER — LAB (OUTPATIENT)
Dept: LAB | Facility: HOSPITAL | Age: 34
End: 2024-01-22
Payer: COMMERCIAL

## 2024-01-22 PROCEDURE — 87340 HEPATITIS B SURFACE AG IA: CPT | Performed by: PHYSICIAN ASSISTANT

## 2024-01-22 PROCEDURE — 80145 DRUG ASSAY ADALIMUMAB: CPT | Performed by: PHYSICIAN ASSISTANT

## 2024-01-22 PROCEDURE — 86480 TB TEST CELL IMMUN MEASURE: CPT | Performed by: PHYSICIAN ASSISTANT

## 2024-01-22 PROCEDURE — 82397 CHEMILUMINESCENT ASSAY: CPT | Performed by: PHYSICIAN ASSISTANT

## 2024-01-23 ENCOUNTER — TELEPHONE (OUTPATIENT)
Dept: GASTROENTEROLOGY | Facility: CLINIC | Age: 34
End: 2024-01-23
Payer: COMMERCIAL

## 2024-01-23 NOTE — TELEPHONE ENCOUNTER
----- Message from Magdy Guerrero sent at 1/23/2024 12:50 PM EST -----  Regarding: coco  Contact: [phone]  good afternoon, I already have the rinvoq pills, tell me if I can start now, I stopped the suppositories because I was getting worse

## 2024-01-24 ENCOUNTER — OFFICE VISIT (OUTPATIENT)
Dept: FAMILY MEDICINE CLINIC | Facility: CLINIC | Age: 34
End: 2024-01-24
Payer: COMMERCIAL

## 2024-01-24 VITALS
SYSTOLIC BLOOD PRESSURE: 118 MMHG | HEART RATE: 96 BPM | BODY MASS INDEX: 21.72 KG/M2 | WEIGHT: 122.6 LBS | HEIGHT: 63 IN | DIASTOLIC BLOOD PRESSURE: 72 MMHG | OXYGEN SATURATION: 97 %

## 2024-01-24 DIAGNOSIS — R71.8 MICROCYTOSIS: ICD-10-CM

## 2024-01-24 DIAGNOSIS — K51.011 ULCERATIVE PANCOLITIS WITH RECTAL BLEEDING: Primary | ICD-10-CM

## 2024-01-24 PROCEDURE — 99213 OFFICE O/P EST LOW 20 MIN: CPT | Performed by: NURSE PRACTITIONER

## 2024-01-24 NOTE — PROGRESS NOTES
"Subjective          Magdy Guerrero presents to [facility] for Abdominal Pain   History of Present Illness    He is here to follow-up on abdominal pain.    Abdominal Pain/Ulcerative Pancolitis - He is followed by GI specialist.  He reports feeling much better after starting the Steroid taper dose prescribed by GI.  He stopped using suppository because it seemed to cause increased rectal pain to the point where he could not sit down.  His rectum feels better since he stopped using suppository.  He denies blood in stool.    Anemia - He is followed by Hematology for iron transfusions.  Since getting iron transfusion, he feels much better.      Review of Systems   Gastrointestinal:  Positive for diarrhea. Negative for abdominal distention, abdominal pain, blood in stool, constipation, nausea, rectal pain and vomiting.        Denies bloody diarrhea; small amount of blood on toilet paper.          Objective   Vital Signs:   /72 (BP Location: Left arm, Patient Position: Sitting, Cuff Size: Adult)   Pulse 96   Ht 160 cm (62.99\")   Wt 55.6 kg (122 lb 9.6 oz)   SpO2 97%   BMI 21.72 kg/m²     BMI is within normal parameters. No other follow-up for BMI required.    Physical Exam  Vitals and nursing note reviewed.   Constitutional:       General: He is not in acute distress.     Appearance: Normal appearance.   HENT:      Head: Normocephalic and atraumatic.   Cardiovascular:      Rate and Rhythm: Normal rate and regular rhythm.      Heart sounds: Normal heart sounds. No murmur heard.  Pulmonary:      Effort: Pulmonary effort is normal. No respiratory distress.      Breath sounds: Normal breath sounds. No wheezing.   Abdominal:      General: Abdomen is flat. Bowel sounds are decreased. There is no distension.      Palpations: Abdomen is soft. There is no mass.      Tenderness: There is no abdominal tenderness. There is no guarding or rebound.   Skin:     General: Skin is warm and dry.     "  Findings: No erythema.   Neurological:      Mental Status: He is alert.   Psychiatric:         Mood and Affect: Mood normal.        Result Review :                 Assessment and Plan    Diagnoses and all orders for this visit:    1. Ulcerative pancolitis with rectal bleeding (Primary)  Comments:  Followed by Dr. Carrera, GI specialist.    2. Microcytosis  Comments:  Hgb 12.5 from labs dated 12/1/2023.  Followed by Dr. Nye, Hematology.        Follow Up   Return in about 6 months (around 7/24/2024) for Annual physical.  Patient was given instructions and counseling regarding his condition or for health maintenance advice. Please see specific information pulled into the AVS if appropriate.

## 2024-01-26 ENCOUNTER — TELEPHONE (OUTPATIENT)
Dept: GASTROENTEROLOGY | Facility: CLINIC | Age: 34
End: 2024-01-26
Payer: COMMERCIAL

## 2024-01-26 NOTE — TELEPHONE ENCOUNTER
----- Message from Tereza Pickett PA-C sent at 1/26/2024  7:59 AM EST -----  TB and hepatitis B test negative.

## 2024-01-26 NOTE — TELEPHONE ENCOUNTER
Message from Christina Freire I saw that Dr. Carrera said to hold off on the Rinvoq until Erin is back but her and I discussed this extensively before we both left for vacation the patient is fine to start Rinvoq, if you could please call him and have him start this immediately.  He can get the Shingrix when he is off steroids

## 2024-02-01 PROBLEM — K51.90 ULCERATIVE COLITIS: Status: ACTIVE | Noted: 2023-06-27

## 2024-02-01 PROBLEM — K51.90 ULCERATIVE COLITIS: Status: RESOLVED | Noted: 2023-06-27 | Resolved: 2024-02-01

## 2024-02-13 ENCOUNTER — TELEPHONE (OUTPATIENT)
Dept: GASTROENTEROLOGY | Facility: CLINIC | Age: 34
End: 2024-02-13
Payer: COMMERCIAL

## 2024-03-04 ENCOUNTER — OFFICE VISIT (OUTPATIENT)
Dept: GASTROENTEROLOGY | Facility: CLINIC | Age: 34
End: 2024-03-04
Payer: COMMERCIAL

## 2024-03-04 VITALS
SYSTOLIC BLOOD PRESSURE: 111 MMHG | WEIGHT: 129 LBS | OXYGEN SATURATION: 98 % | BODY MASS INDEX: 22.02 KG/M2 | TEMPERATURE: 98.6 F | HEIGHT: 64 IN | HEART RATE: 70 BPM | DIASTOLIC BLOOD PRESSURE: 75 MMHG

## 2024-03-04 DIAGNOSIS — D50.0 IRON DEFICIENCY ANEMIA DUE TO CHRONIC BLOOD LOSS: ICD-10-CM

## 2024-03-04 DIAGNOSIS — K51.011 ULCERATIVE PANCOLITIS WITH RECTAL BLEEDING: Primary | ICD-10-CM

## 2024-03-04 PROCEDURE — 1160F RVW MEDS BY RX/DR IN RCRD: CPT | Performed by: PHYSICIAN ASSISTANT

## 2024-03-04 PROCEDURE — 1159F MED LIST DOCD IN RCRD: CPT | Performed by: PHYSICIAN ASSISTANT

## 2024-03-04 PROCEDURE — 99214 OFFICE O/P EST MOD 30 MIN: CPT | Performed by: PHYSICIAN ASSISTANT

## 2024-03-04 NOTE — PROGRESS NOTES
"Chief Complaint  ulcerative pancolitis    Subjective          History Of Present Illness:    Magdy Guerrero is a  33 y.o. male patient of Dr. Carrera who presents as a follow-up for ulcerative colitis.  Patient was diagnosed in June 2023 during his hospitalization. He was last seen by me on 1/18/2024 and was on Humira at that time and continued to endorse ongoing abdominal pain, rectal bleeding, diarrhea. Patient has now since been initiated on Rinvoq 45 mg and will remain on 30 mg for his maintenance dosing.     Patient has been off steroids for about two days now.  She reports he has been doing significantly better since initiating on Rinvoq and completing his steroid taper. Patient is moving his bowels around 2-3 times a day and they have formed up significantly. Patient reports his acne has improved since being off the steroids. Still some occasional rectal bleeding but mostly with wiping. Patient denies any nausea, vomiting, melena, weight loss, joint pain, eye pain/erythema. Patient has gained some weight. Appetite is good.     Additional data reviewed:  Colonoscopy 6/29/2023 - Diffuse severe inflammation within the rectum, rectosigmoid colon, descending colon, transverse colon, ascending colon consistent with ulcerative pancolitis.  EGD 6/29/2023 showed irregular Z-line, mild gastritis.  Pathology with normal duodenum, chronic inactive gastritis, negative H. pylori, reflux esophagitis, negative metaplasia.     Objective   Vital Signs:   /75   Pulse 70   Temp 98.6 °F (37 °C)   Ht 162.6 cm (64\")   Wt 58.5 kg (129 lb)   SpO2 98%   BMI 22.14 kg/m²       Physical Exam  Vitals reviewed.   Constitutional:       General: He is not in acute distress.     Appearance: Normal appearance. He is not ill-appearing.   HENT:      Head: Normocephalic and atraumatic.      Nose: Nose normal.      Mouth/Throat:      Pharynx: Oropharynx is clear.   Eyes:      Extraocular Movements: Extraocular movements intact.      " Conjunctiva/sclera: Conjunctivae normal.      Pupils: Pupils are equal, round, and reactive to light.   Pulmonary:      Effort: Pulmonary effort is normal.   Abdominal:      General: There is no distension.      Palpations: Abdomen is soft. There is no mass.      Tenderness: There is no abdominal tenderness.   Musculoskeletal:         General: No swelling. Normal range of motion.      Cervical back: Normal range of motion.   Skin:     General: Skin is warm and dry.      Findings: No bruising or rash.   Neurological:      General: No focal deficit present.      Mental Status: He is alert and oriented to person, place, and time.      Motor: No weakness.      Gait: Gait normal.   Psychiatric:         Mood and Affect: Mood normal.          Result Review :   The following data was reviewed by: Tereza Wallis PA-C on 03/04/2024:  CMP          7/21/2023    10:50 8/18/2023    10:55 10/19/2023    15:39   CMP   Glucose 98  95  98    BUN 10  8  6    Creatinine 0.70  0.85  0.69    EGFR 126.3      Sodium 138  137  139    Potassium 4.2  4.0  3.9    Chloride 105  98  103    Calcium 9.3  9.3  9.0    Total Protein  6.2  7.0    Total Protein 6.3      Albumin 4.2  3.7  3.7    Globulin  2.5  3.3    Globulin 2.1      Total Bilirubin 0.4  <0.2  <0.2    Alkaline Phosphatase 71  67  158    AST (SGOT) 25  10  15    ALT (SGPT) 54  13  18    Albumin/Globulin Ratio 2.0      BUN/Creatinine Ratio 14.3  9.4  9    Anion Gap 9.0        CBC          10/26/2023    09:50 11/21/2023    10:00 12/1/2023    10:35   CBC   WBC 8.20  7.25  8.58    RBC 3.90  4.58  4.77    Hemoglobin 8.8  12.2  12.5    Hematocrit 29.1  38.1  39.9    MCV 74.6  83.2  83.6    MCH 22.6  26.6  26.2    MCHC 30.2  32.0  31.3    RDW 16.9  24.0  23.5    Platelets 499  338  326            Assessment and Plan    Diagnoses and all orders for this visit:    1. Ulcerative pancolitis with rectal bleeding (Primary)  -     Zoster Vac Recomb Adjuvanted 50 MCG/0.5ML reconstituted  suspension; Inject 0.5 mL into the appropriate muscle as directed by prescriber 1 (One) Time for 1 dose. Repeat second dose after at least 2 months.  Dispense: 1 each; Refill: 1    2. Iron deficiency anemia due to chronic blood loss       Continue Rinvoq at 45 mg for induction phase with plans to remain on 30 mg daily for maintenance dosing.  Patient continues to follow with Dr. Nye for iron deficiency anemia and has repeat labs scheduled for March 20th.   Obtain shingles vaccine  Will plan for follow-up in 3 months to assess ongoing response to Rinvoq.    Follow Up   Return in about 3 months (around 6/4/2024) for Dr. Carrera or Tereza Pickett PA-C.    Dragon dictation used throughout this note.            Tereza Pickett PA-C   [facility]  [address] [place] [address]  Office: [phone]

## 2024-03-13 ENCOUNTER — SPECIALTY PHARMACY (OUTPATIENT)
Dept: GASTROENTEROLOGY | Facility: CLINIC | Age: 34
End: 2024-03-13
Payer: COMMERCIAL

## 2024-03-13 NOTE — PROGRESS NOTES
Specialty Pharmacy Refill Coordination Note     Magdy is a 33 y.o. male contacted today regarding refills of  1 specialty medication(s).    Reviewed and verified with patient: Rinvoq 30 mg    Specialty medication(s) and dose(s) confirmed: yes    Refill Questions      Flowsheet Row Most Recent Value   Changes to allergies? No   Changes to medications? No   New conditions or infections since last clinic visit No   Unplanned office visit, urgent care, ED, or hospital admission in the last 4 weeks  No   How does patient/caregiver feel medication is working? Very good   Financial problems or insurance changes  No   Since the previous refill, were any specialty medication doses or scheduled injections missed or delayed?  No   Does this patient require a clinical escalation to a pharmacist? No            Delivery Questions      Flowsheet Row Most Recent Value   Delivery method Beeline   Delivery address verified with patient/caregiver? Yes   Delivery address Home   Number of medications in delivery 1   Medication(s) being filled and delivered Upadacitinib   Doses left of specialty medications 5   Copay verified? Yes   Copay amount 0   Copay form of payment No copayment ($0)                   Follow-up: 85 day(s)     Ela Quiroga  Specialty Pharmacy Technician

## 2024-03-15 DIAGNOSIS — D50.0 IRON DEFICIENCY ANEMIA DUE TO CHRONIC BLOOD LOSS: Primary | ICD-10-CM

## 2024-03-20 ENCOUNTER — LAB (OUTPATIENT)
Dept: OTHER | Facility: HOSPITAL | Age: 34
End: 2024-03-20
Payer: COMMERCIAL

## 2024-03-20 DIAGNOSIS — D50.0 IRON DEFICIENCY ANEMIA DUE TO CHRONIC BLOOD LOSS: ICD-10-CM

## 2024-03-20 LAB
BASOPHILS # BLD AUTO: 0 10*3/MM3 (ref 0–0.2)
BASOPHILS NFR BLD AUTO: 0 % (ref 0–1.5)
DEPRECATED RDW RBC AUTO: 44.9 FL (ref 37–54)
EOSINOPHIL # BLD AUTO: 0.11 10*3/MM3 (ref 0–0.4)
EOSINOPHIL NFR BLD AUTO: 2.4 % (ref 0.3–6.2)
ERYTHROCYTE [DISTWIDTH] IN BLOOD BY AUTOMATED COUNT: 13 % (ref 12.3–15.4)
FERRITIN SERPL-MCNC: 149.5 NG/ML (ref 30–400)
HCT VFR BLD AUTO: 38 % (ref 37.5–51)
HGB BLD-MCNC: 12.5 G/DL (ref 13–17.7)
HGB RETIC QN AUTO: 31.6 PG (ref 29.8–36.1)
IMM GRANULOCYTES # BLD AUTO: 0 10*3/MM3 (ref 0–0.05)
IMM GRANULOCYTES NFR BLD AUTO: 0 % (ref 0–0.5)
IMM RETICS NFR: 5.1 % (ref 3–15.8)
IRON 24H UR-MRATE: 29 MCG/DL (ref 59–158)
IRON SATN MFR SERPL: 7 % (ref 20–50)
LYMPHOCYTES # BLD AUTO: 1.88 10*3/MM3 (ref 0.7–3.1)
LYMPHOCYTES NFR BLD AUTO: 41.9 % (ref 19.6–45.3)
MCH RBC QN AUTO: 31 PG (ref 26.6–33)
MCHC RBC AUTO-ENTMCNC: 32.9 G/DL (ref 31.5–35.7)
MCV RBC AUTO: 94.3 FL (ref 79–97)
MONOCYTES # BLD AUTO: 0.44 10*3/MM3 (ref 0.1–0.9)
MONOCYTES NFR BLD AUTO: 9.8 % (ref 5–12)
NEUTROPHILS NFR BLD AUTO: 2.06 10*3/MM3 (ref 1.7–7)
NEUTROPHILS NFR BLD AUTO: 45.9 % (ref 42.7–76)
NRBC BLD AUTO-RTO: 0 /100 WBC (ref 0–0.2)
PLATELET # BLD AUTO: 214 10*3/MM3 (ref 140–450)
PMV BLD AUTO: 9.4 FL (ref 6–12)
RBC # BLD AUTO: 4.03 10*6/MM3 (ref 4.14–5.8)
RETICS # AUTO: 0.02 10*6/MM3 (ref 0.02–0.13)
RETICS/RBC NFR AUTO: 0.54 % (ref 0.7–1.9)
TIBC SERPL-MCNC: 431 MCG/DL (ref 298–536)
TRANSFERRIN SERPL-MCNC: 289 MG/DL (ref 200–360)
WBC NRBC COR # BLD AUTO: 4.49 10*3/MM3 (ref 3.4–10.8)

## 2024-03-20 PROCEDURE — 83540 ASSAY OF IRON: CPT | Performed by: INTERNAL MEDICINE

## 2024-03-20 PROCEDURE — 36415 COLL VENOUS BLD VENIPUNCTURE: CPT

## 2024-03-20 PROCEDURE — 82728 ASSAY OF FERRITIN: CPT | Performed by: INTERNAL MEDICINE

## 2024-03-20 PROCEDURE — 85025 COMPLETE CBC W/AUTO DIFF WBC: CPT | Performed by: INTERNAL MEDICINE

## 2024-03-20 PROCEDURE — 84466 ASSAY OF TRANSFERRIN: CPT | Performed by: INTERNAL MEDICINE

## 2024-03-20 PROCEDURE — 85046 RETICYTE/HGB CONCENTRATE: CPT | Performed by: INTERNAL MEDICINE

## 2024-03-21 ENCOUNTER — OFFICE VISIT (OUTPATIENT)
Dept: ONCOLOGY | Facility: CLINIC | Age: 34
End: 2024-03-21
Payer: COMMERCIAL

## 2024-03-21 VITALS
WEIGHT: 131.2 LBS | RESPIRATION RATE: 16 BRPM | TEMPERATURE: 97.5 F | HEIGHT: 64 IN | OXYGEN SATURATION: 98 % | BODY MASS INDEX: 22.4 KG/M2 | SYSTOLIC BLOOD PRESSURE: 102 MMHG | DIASTOLIC BLOOD PRESSURE: 63 MMHG | HEART RATE: 64 BPM

## 2024-03-21 DIAGNOSIS — D50.0 IRON DEFICIENCY ANEMIA DUE TO CHRONIC BLOOD LOSS: Primary | ICD-10-CM

## 2024-03-21 PROCEDURE — 99214 OFFICE O/P EST MOD 30 MIN: CPT | Performed by: INTERNAL MEDICINE

## 2024-03-21 PROCEDURE — 1126F AMNT PAIN NOTED NONE PRSNT: CPT | Performed by: INTERNAL MEDICINE

## 2024-03-21 NOTE — PROGRESS NOTES
.     REASON FOR FOLLOWUP :   Iron deficiency anemia    HISTORY OF PRESENT ILLNESS:  The patient is a 33 y.o. year old male  who is here for follow-up with the above-mentioned history.    States he felt better, with more energy, after the IV iron previously.  States he is on a new medicine for his ulcerative colitis and this is better controlled although he is still having some bleeding    Past Medical History:   Diagnosis Date    History of iron deficiency anemia     Ulcerative colitis 2023     Past Surgical History:   Procedure Laterality Date    COLONOSCOPY N/A 2023    Procedure: COLONOSCOPY TO CECUM/TI WITH COLD BIOPSIES THROUGHOUT;  Surgeon: Jovanni Carrera MD;  Location: [facility] ENDOSCOPY;  Service: Gastroenterology;  Laterality: N/A;  pre: HEMATOCHEZIA  post: PAN -COLITIS    ENDOSCOPY N/A 2023    Procedure: ESOPHAGOGASTRODUODENOSCOPY WITH BIOPSIES;  Surgeon: Jovanni Carrera MD;  Location: [facility] ENDOSCOPY;  Service: Gastroenterology;  Laterality: N/A;  pre: MELENA  post: MILD ESOPHAGITIS, GASTRITIS       MEDICATIONS    Current Outpatient Medications:     omeprazole (priLOSEC) 40 MG capsule, Take 1 capsule by mouth Daily., Disp: 90 capsule, Rfl: 1    Upadacitinib ER (Rinvoq) 30 MG tablet sustained-release 24 hour, Take 1 tablet by mouth Daily., Disp: 30 tablet, Rfl: 10    upadacitinib ER (Rinvoq) 45 MG tablet sustained-release 24 hour extended release tablet, Take 1 tablet by mouth Daily., Disp: 28 tablet, Rfl: 1    ALLERGIES:   No Known Allergies    SOCIAL HISTORY:       Social History     Socioeconomic History    Marital status: Single   Tobacco Use    Smoking status: Former     Current packs/day: 0.00     Average packs/day: 0.5 packs/day for 1 year (0.5 ttl pk-yrs)     Types: Cigarettes     Start date: 2019     Quit date: 2020     Years since quittin.2     Passive exposure: Past    Smokeless tobacco: Never   Vaping Use    Vaping status: Never Used   Substance and  "Sexual Activity    Alcohol use: Never    Drug use: Yes     Types: Marijuana     Comment: once a day    Sexual activity: Defer         FAMILY HISTORY:  Family History   Problem Relation Age of Onset    Cirrhosis Mother        REVIEW OF SYSTEMS:  Review of Systems   Constitutional:  Negative for activity change.   HENT:  Negative for nosebleeds and trouble swallowing.    Respiratory:  Negative for shortness of breath and wheezing.    Cardiovascular:  Negative for chest pain and palpitations.   Gastrointestinal:  Negative for constipation, diarrhea and nausea.   Genitourinary:  Negative for dysuria and hematuria.   Musculoskeletal:  Negative for arthralgias and myalgias.   Neurological:  Negative for seizures and syncope.   Hematological:  Negative for adenopathy. Does not bruise/bleed easily.   Psychiatric/Behavioral:  Negative for confusion.               Vitals:    03/21/24 0952   BP: 102/63   Pulse: 64   Resp: 16   Temp: 97.5 °F (36.4 °C)   TempSrc: Temporal   SpO2: 98%   Weight: 59.5 kg (131 lb 3.2 oz)   Height: 162 cm (63.78\")   PainSc: 0-No pain         3/21/2024     9:54 AM   Current Status   ECOG score 0      PHYSICAL EXAM:        CONSTITUTIONAL:  Vital signs reviewed.  No distress, looks comfortable.  EYES:  Conjunctiva and lids unremarkable.  PERRLA  EARS,NOSE,MOUTH,THROAT:  Ears and nose appear unremarkable.  Lips, teeth, gums appear unremarkable.  RESPIRATORY:  Normal respiratory effort.  Lungs clear to auscultation bilaterally.  CARDIOVASCULAR:  Normal S1, S2.  No murmurs rubs or gallops.  No significant lower extremity edema.  GASTROINTESTINAL: Abdomen appears unremarkable.  Nontender.  No hepatomegaly.  No splenomegaly.  LYMPHATIC:  No cervical, supraclavicular, axillary lymphadenopathy.  SKIN:  Warm.  No rashes.  PSYCHIATRIC:  Normal judgment and insight.  Normal mood and affect.          RECENT LABS:        WBC   Date Value Ref Range Status   03/20/2024 4.49 3.40 - 10.80 10*3/mm3 Final   12/01/2023 " 8.58 3.40 - 10.80 10*3/mm3 Final   11/21/2023 7.25 3.40 - 10.80 10*3/mm3 Final   10/26/2023 8.20 3.40 - 10.80 10*3/mm3 Final   10/19/2023 8.6 3.4 - 10.8 x10E3/uL Final   08/18/2023 9.54 3.40 - 10.80 10*3/mm3 Final   07/27/2023 8.1 3.4 - 10.8 x10E3/uL Final   07/21/2023 5.20 3.40 - 10.80 10*3/mm3 Final   07/02/2023 9.35 3.40 - 10.80 10*3/mm3 Final   07/01/2023 7.61 3.40 - 10.80 10*3/mm3 Final   06/30/2023 6.71 3.40 - 10.80 10*3/mm3 Final   06/29/2023 5.88 3.40 - 10.80 10*3/mm3 Final   06/28/2023 7.62 3.40 - 10.80 10*3/mm3 Final   06/27/2023 8.29 3.40 - 10.80 10*3/mm3 Final     Hemoglobin   Date Value Ref Range Status   03/20/2024 12.5 (L) 13.0 - 17.7 g/dL Final   12/01/2023 12.5 (L) 13.0 - 17.7 g/dL Final   11/21/2023 12.2 (L) 13.0 - 17.7 g/dL Final   10/26/2023 8.8 (L) 13.0 - 17.7 g/dL Final   10/19/2023 8.8 (L) 13.0 - 17.7 g/dL Final   08/18/2023 12.0 (L) 13.0 - 17.7 g/dL Final   07/27/2023 13.4 13.0 - 17.7 g/dL Final   07/21/2023 13.0 13.0 - 17.7 g/dL Final   07/02/2023 12.6 (L) 13.0 - 17.7 g/dL Final   07/01/2023 12.7 (L) 13.0 - 17.7 g/dL Final   06/30/2023 11.7 (L) 13.0 - 17.7 g/dL Final   06/29/2023 11.6 (L) 13.0 - 17.7 g/dL Final   06/28/2023 12.5 (L) 13.0 - 17.7 g/dL Final   06/27/2023 14.5 13.0 - 17.7 g/dL Final     Platelets   Date Value Ref Range Status   03/20/2024 214 140 - 450 10*3/mm3 Final   12/01/2023 326 140 - 450 10*3/mm3 Final   11/21/2023 338 140 - 450 10*3/mm3 Final   10/26/2023 499 (H) 140 - 450 10*3/mm3 Final   10/19/2023 563 (H) 150 - 450 x10E3/uL Final   08/18/2023 393 140 - 450 10*3/mm3 Final   07/27/2023 247 150 - 450 x10E3/uL Final   07/21/2023 198 140 - 450 10*3/mm3 Final   07/02/2023 247 140 - 450 10*3/mm3 Final   07/01/2023 220 140 - 450 10*3/mm3 Final   06/30/2023 189 140 - 450 10*3/mm3 Final   06/29/2023 186 140 - 450 10*3/mm3 Final   06/28/2023 210 140 - 450 10*3/mm3 Final   06/27/2023 218 140 - 450 10*3/mm3 Final       Assessment & Plan   There are no diagnoses linked to this  encounter.      Madgy Guerrero   *Iron deficiency anemia  Begin ferrous sulfate twice per day 10/20/2023.  10/26/2023 Hb 8.8, unchanged from 10/19/2023.  No GI side effects yet from ferrous sulfate.  Therefore, continue this for another month or so and reassess to see if it is working.  He will call us if he becomes intolerant to oral iron in which case we will arrange IV iron.  11/21/2023: Ferritin 30, 7% saturation, Hb 12.2.  B12 459.  Serum folate >20.  12/1/2023: Hb 12.5.  Cannot tolerate oral iron due to abdominal pain.  Arrange Injectafer x 2 doses.  If insurance will not cover Injectafer then arrange Venofer 200 mg x 5 doses.    3/20/2024: Ferritin 150, 7% saturation, Hb 12.5, reticulated Hb 31.6.  Arrange Venofer 200 mg x 3 doses    *Possible allergy to ferrous sulfate  12/1/2023: Red raised rash bilateral arms and chest.  He states this has been present since beginning oral iron.  Premedicine's for IV iron: Pepcid 20 mg IV, Benadryl 25 mg IV, Solu-Cortef 40 mg IV, Tylenol 650 mg oral    *Source of iron deficiency  Follows with GI for ulcerative colitis    *Ulcerative colitis    *Microcytosis, likely due to iron deficiency.  MCV 94.3    *Thrombocytosis, likely due to iron deficiency      Plan  Venofer 200 mg x 3 doses.  Because of a red raised rash that may have been caused by oral ferrous sulfate, premedicine's for IV iron:   Pepcid 20 mg IV, Benadryl 25 mg IV, Solu-Cortef 40 mg IV, Tylenol 650 mg oral  MD 2 or 3 months.  At least 1 day prior: Ferritin, iron panel, CBC, reticulate hemoglobin

## 2024-03-26 ENCOUNTER — INFUSION (OUTPATIENT)
Dept: ONCOLOGY | Facility: HOSPITAL | Age: 34
End: 2024-03-26
Payer: COMMERCIAL

## 2024-03-26 VITALS
HEART RATE: 80 BPM | TEMPERATURE: 98.9 F | BODY MASS INDEX: 22.82 KG/M2 | WEIGHT: 128.8 LBS | RESPIRATION RATE: 15 BRPM | HEIGHT: 63 IN | DIASTOLIC BLOOD PRESSURE: 66 MMHG | OXYGEN SATURATION: 97 % | SYSTOLIC BLOOD PRESSURE: 107 MMHG

## 2024-03-26 DIAGNOSIS — D50.0 IRON DEFICIENCY ANEMIA DUE TO CHRONIC BLOOD LOSS: Primary | ICD-10-CM

## 2024-03-26 PROCEDURE — 25010000002 IRON SUCROSE PER 1 MG: Performed by: NURSE PRACTITIONER

## 2024-03-26 PROCEDURE — 25010000002 HYDROCORTISONE SOD SUC (PF) 100 MG RECONSTITUTED SOLUTION: Performed by: NURSE PRACTITIONER

## 2024-03-26 PROCEDURE — 96375 TX/PRO/DX INJ NEW DRUG ADDON: CPT

## 2024-03-26 PROCEDURE — 25010000002 DIPHENHYDRAMINE PER 50 MG: Performed by: NURSE PRACTITIONER

## 2024-03-26 PROCEDURE — 25810000003 SODIUM CHLORIDE 0.9 % SOLUTION: Performed by: NURSE PRACTITIONER

## 2024-03-26 PROCEDURE — 96374 THER/PROPH/DIAG INJ IV PUSH: CPT

## 2024-03-26 RX ORDER — DIPHENHYDRAMINE HYDROCHLORIDE 50 MG/ML
25 INJECTION INTRAMUSCULAR; INTRAVENOUS ONCE
Status: COMPLETED | OUTPATIENT
Start: 2024-03-26 | End: 2024-03-26

## 2024-03-26 RX ORDER — ACETAMINOPHEN 325 MG/1
650 TABLET ORAL ONCE
Status: COMPLETED | OUTPATIENT
Start: 2024-03-26 | End: 2024-03-26

## 2024-03-26 RX ORDER — FAMOTIDINE 10 MG/ML
20 INJECTION, SOLUTION INTRAVENOUS ONCE
Status: COMPLETED | OUTPATIENT
Start: 2024-03-26 | End: 2024-03-26

## 2024-03-26 RX ORDER — SODIUM CHLORIDE 9 MG/ML
20 INJECTION, SOLUTION INTRAVENOUS ONCE
Status: COMPLETED | OUTPATIENT
Start: 2024-03-26 | End: 2024-03-26

## 2024-03-26 RX ADMIN — SODIUM CHLORIDE 20 ML/HR: 9 INJECTION, SOLUTION INTRAVENOUS at 10:58

## 2024-03-26 RX ADMIN — DIPHENHYDRAMINE HYDROCHLORIDE 25 MG: 50 INJECTION INTRAMUSCULAR; INTRAVENOUS at 11:18

## 2024-03-26 RX ADMIN — IRON SUCROSE 200 MG: 20 INJECTION, SOLUTION INTRAVENOUS at 11:59

## 2024-03-26 RX ADMIN — HYDROCORTISONE SODIUM SUCCINATE 40 MG: 100 INJECTION, POWDER, FOR SOLUTION INTRAMUSCULAR; INTRAVENOUS at 11:01

## 2024-03-26 RX ADMIN — ACETAMINOPHEN 650 MG: 325 TABLET ORAL at 11:00

## 2024-03-26 RX ADMIN — FAMOTIDINE 20 MG: 10 INJECTION INTRAVENOUS at 11:13

## 2024-03-27 RX ORDER — ACETAMINOPHEN 325 MG/1
650 TABLET ORAL ONCE
Status: CANCELLED | OUTPATIENT
Start: 2024-04-02

## 2024-03-27 RX ORDER — FAMOTIDINE 10 MG/ML
20 INJECTION, SOLUTION INTRAVENOUS ONCE
Status: CANCELLED | OUTPATIENT
Start: 2024-04-09

## 2024-03-27 RX ORDER — ACETAMINOPHEN 325 MG/1
650 TABLET ORAL ONCE
Status: CANCELLED | OUTPATIENT
Start: 2024-04-09

## 2024-03-27 RX ORDER — DIPHENHYDRAMINE HYDROCHLORIDE 50 MG/ML
25 INJECTION INTRAMUSCULAR; INTRAVENOUS ONCE
Status: CANCELLED | OUTPATIENT
Start: 2024-04-09

## 2024-03-27 RX ORDER — SODIUM CHLORIDE 9 MG/ML
20 INJECTION, SOLUTION INTRAVENOUS ONCE
Status: CANCELLED | OUTPATIENT
Start: 2024-04-02

## 2024-03-27 RX ORDER — FAMOTIDINE 10 MG/ML
20 INJECTION, SOLUTION INTRAVENOUS ONCE
Status: CANCELLED | OUTPATIENT
Start: 2024-04-02

## 2024-03-27 RX ORDER — DIPHENHYDRAMINE HYDROCHLORIDE 50 MG/ML
25 INJECTION INTRAMUSCULAR; INTRAVENOUS ONCE
Status: CANCELLED | OUTPATIENT
Start: 2024-04-02

## 2024-03-27 RX ORDER — SODIUM CHLORIDE 9 MG/ML
20 INJECTION, SOLUTION INTRAVENOUS ONCE
Status: CANCELLED | OUTPATIENT
Start: 2024-04-09

## 2024-04-02 ENCOUNTER — INFUSION (OUTPATIENT)
Dept: ONCOLOGY | Facility: HOSPITAL | Age: 34
End: 2024-04-02
Payer: COMMERCIAL

## 2024-04-02 VITALS
TEMPERATURE: 97.3 F | HEIGHT: 63 IN | BODY MASS INDEX: 23.71 KG/M2 | SYSTOLIC BLOOD PRESSURE: 109 MMHG | WEIGHT: 133.8 LBS | HEART RATE: 85 BPM | OXYGEN SATURATION: 100 % | DIASTOLIC BLOOD PRESSURE: 70 MMHG | RESPIRATION RATE: 15 BRPM

## 2024-04-02 DIAGNOSIS — D50.0 IRON DEFICIENCY ANEMIA DUE TO CHRONIC BLOOD LOSS: Primary | ICD-10-CM

## 2024-04-02 PROCEDURE — 25010000002 HYDROCORTISONE SOD SUC (PF) 100 MG RECONSTITUTED SOLUTION: Performed by: INTERNAL MEDICINE

## 2024-04-02 PROCEDURE — 25010000002 IRON SUCROSE PER 1 MG: Performed by: INTERNAL MEDICINE

## 2024-04-02 PROCEDURE — 96375 TX/PRO/DX INJ NEW DRUG ADDON: CPT

## 2024-04-02 PROCEDURE — 25810000003 SODIUM CHLORIDE 0.9 % SOLUTION: Performed by: INTERNAL MEDICINE

## 2024-04-02 PROCEDURE — 25010000002 DIPHENHYDRAMINE PER 50 MG: Performed by: INTERNAL MEDICINE

## 2024-04-02 PROCEDURE — 96374 THER/PROPH/DIAG INJ IV PUSH: CPT

## 2024-04-02 RX ORDER — SODIUM CHLORIDE 9 MG/ML
20 INJECTION, SOLUTION INTRAVENOUS ONCE
Status: COMPLETED | OUTPATIENT
Start: 2024-04-02 | End: 2024-04-02

## 2024-04-02 RX ORDER — ACETAMINOPHEN 325 MG/1
650 TABLET ORAL ONCE
Status: COMPLETED | OUTPATIENT
Start: 2024-04-02 | End: 2024-04-02

## 2024-04-02 RX ORDER — FAMOTIDINE 10 MG/ML
20 INJECTION, SOLUTION INTRAVENOUS ONCE
Status: COMPLETED | OUTPATIENT
Start: 2024-04-02 | End: 2024-04-02

## 2024-04-02 RX ADMIN — HYDROCORTISONE SODIUM SUCCINATE 40 MG: 100 INJECTION, POWDER, FOR SOLUTION INTRAMUSCULAR; INTRAVENOUS at 10:42

## 2024-04-02 RX ADMIN — ACETAMINOPHEN 650 MG: 325 TABLET ORAL at 10:41

## 2024-04-02 RX ADMIN — IRON SUCROSE 200 MG: 20 INJECTION, SOLUTION INTRAVENOUS at 11:27

## 2024-04-02 RX ADMIN — SODIUM CHLORIDE 20 ML/HR: 9 INJECTION, SOLUTION INTRAVENOUS at 10:41

## 2024-04-02 RX ADMIN — FAMOTIDINE 20 MG: 10 INJECTION INTRAVENOUS at 10:43

## 2024-04-02 RX ADMIN — DIPHENHYDRAMINE HYDROCHLORIDE 25 MG: 50 INJECTION INTRAMUSCULAR; INTRAVENOUS at 10:45

## 2024-04-09 ENCOUNTER — INFUSION (OUTPATIENT)
Dept: ONCOLOGY | Facility: HOSPITAL | Age: 34
End: 2024-04-09
Payer: COMMERCIAL

## 2024-04-09 VITALS
SYSTOLIC BLOOD PRESSURE: 102 MMHG | BODY MASS INDEX: 22.86 KG/M2 | HEART RATE: 68 BPM | TEMPERATURE: 97.9 F | WEIGHT: 129 LBS | RESPIRATION RATE: 16 BRPM | HEIGHT: 63 IN | DIASTOLIC BLOOD PRESSURE: 64 MMHG | OXYGEN SATURATION: 99 %

## 2024-04-09 DIAGNOSIS — D50.0 IRON DEFICIENCY ANEMIA DUE TO CHRONIC BLOOD LOSS: Primary | ICD-10-CM

## 2024-04-09 PROCEDURE — 25010000002 DIPHENHYDRAMINE PER 50 MG: Performed by: INTERNAL MEDICINE

## 2024-04-09 PROCEDURE — 25010000002 HYDROCORTISONE SOD SUC (PF) 100 MG RECONSTITUTED SOLUTION: Performed by: INTERNAL MEDICINE

## 2024-04-09 PROCEDURE — 96375 TX/PRO/DX INJ NEW DRUG ADDON: CPT

## 2024-04-09 PROCEDURE — 25810000003 SODIUM CHLORIDE 0.9 % SOLUTION: Performed by: INTERNAL MEDICINE

## 2024-04-09 PROCEDURE — 96374 THER/PROPH/DIAG INJ IV PUSH: CPT

## 2024-04-09 PROCEDURE — 25010000002 IRON SUCROSE PER 1 MG: Performed by: INTERNAL MEDICINE

## 2024-04-09 RX ORDER — ACETAMINOPHEN 325 MG/1
650 TABLET ORAL ONCE
Status: COMPLETED | OUTPATIENT
Start: 2024-04-09 | End: 2024-04-09

## 2024-04-09 RX ORDER — FAMOTIDINE 10 MG/ML
20 INJECTION, SOLUTION INTRAVENOUS ONCE
Status: COMPLETED | OUTPATIENT
Start: 2024-04-09 | End: 2024-04-09

## 2024-04-09 RX ORDER — SODIUM CHLORIDE 9 MG/ML
20 INJECTION, SOLUTION INTRAVENOUS ONCE
Status: COMPLETED | OUTPATIENT
Start: 2024-04-09 | End: 2024-04-09

## 2024-04-09 RX ORDER — DIPHENHYDRAMINE HYDROCHLORIDE 50 MG/ML
25 INJECTION INTRAMUSCULAR; INTRAVENOUS ONCE
Status: COMPLETED | OUTPATIENT
Start: 2024-04-09 | End: 2024-04-09

## 2024-04-09 RX ADMIN — FAMOTIDINE 20 MG: 10 INJECTION INTRAVENOUS at 10:25

## 2024-04-09 RX ADMIN — ACETAMINOPHEN 650 MG: 325 TABLET ORAL at 10:23

## 2024-04-09 RX ADMIN — HYDROCORTISONE SODIUM SUCCINATE 40 MG: 100 INJECTION, POWDER, FOR SOLUTION INTRAMUSCULAR; INTRAVENOUS at 10:23

## 2024-04-09 RX ADMIN — SODIUM CHLORIDE 20 ML/HR: 9 INJECTION, SOLUTION INTRAVENOUS at 10:20

## 2024-04-09 RX ADMIN — IRON SUCROSE 200 MG: 20 INJECTION, SOLUTION INTRAVENOUS at 10:52

## 2024-04-09 RX ADMIN — DIPHENHYDRAMINE HYDROCHLORIDE 25 MG: 50 INJECTION INTRAMUSCULAR; INTRAVENOUS at 10:28

## 2024-06-05 ENCOUNTER — SPECIALTY PHARMACY (OUTPATIENT)
Dept: GASTROENTEROLOGY | Facility: CLINIC | Age: 34
End: 2024-06-05
Payer: COMMERCIAL

## 2024-06-05 NOTE — PROGRESS NOTES
Specialty Pharmacy Refill Coordination Note     Magdy is a 33 y.o. male contacted today regarding refills of  1 specialty medication(s).    Reviewed and verified with patient: Rinvoq    Specialty medication(s) and dose(s) confirmed: yes    Refill Questions      Flowsheet Row Most Recent Value   Changes to allergies? No   Changes to medications? No   New conditions or infections since last clinic visit No   Unplanned office visit, urgent care, ED, or hospital admission in the last 4 weeks  No   How does patient/caregiver feel medication is working? Very good   Financial problems or insurance changes  No   Since the previous refill, were any specialty medication doses or scheduled injections missed or delayed?  No   Does this patient require a clinical escalation to a pharmacist? No            Delivery Questions      Flowsheet Row Most Recent Value   Delivery method Beeline   Delivery address verified with patient/caregiver? Yes   Delivery address Home   Number of medications in delivery 1   Medication(s) being filled and delivered Upadacitinib   Doses left of specialty medications 1 week   Copay verified? Yes   Copay amount 0   Copay form of payment No copayment ($0)                   Follow-up: 82 day(s)     Ela Quiroga  Specialty Pharmacy Technician

## 2024-06-11 ENCOUNTER — LAB (OUTPATIENT)
Dept: OTHER | Facility: HOSPITAL | Age: 34
End: 2024-06-11
Payer: COMMERCIAL

## 2024-06-11 DIAGNOSIS — D50.0 IRON DEFICIENCY ANEMIA DUE TO CHRONIC BLOOD LOSS: ICD-10-CM

## 2024-06-11 LAB
BASOPHILS # BLD AUTO: 0.01 10*3/MM3 (ref 0–0.2)
BASOPHILS NFR BLD AUTO: 0.2 % (ref 0–1.5)
DEPRECATED RDW RBC AUTO: 43.9 FL (ref 37–54)
EOSINOPHIL # BLD AUTO: 0.07 10*3/MM3 (ref 0–0.4)
EOSINOPHIL NFR BLD AUTO: 1.6 % (ref 0.3–6.2)
ERYTHROCYTE [DISTWIDTH] IN BLOOD BY AUTOMATED COUNT: 12.6 % (ref 12.3–15.4)
FERRITIN SERPL-MCNC: 147.2 NG/ML (ref 30–400)
HCT VFR BLD AUTO: 41 % (ref 37.5–51)
HGB BLD-MCNC: 14 G/DL (ref 13–17.7)
HGB RETIC QN AUTO: 35.7 PG (ref 29.8–36.1)
IMM GRANULOCYTES # BLD AUTO: 0.01 10*3/MM3 (ref 0–0.05)
IMM GRANULOCYTES NFR BLD AUTO: 0.2 % (ref 0–0.5)
IMM RETICS NFR: 9.1 % (ref 3–15.8)
IRON 24H UR-MRATE: 110 MCG/DL (ref 59–158)
IRON SATN MFR SERPL: 26 % (ref 20–50)
LYMPHOCYTES # BLD AUTO: 2.08 10*3/MM3 (ref 0.7–3.1)
LYMPHOCYTES NFR BLD AUTO: 47.6 % (ref 19.6–45.3)
MCH RBC QN AUTO: 32.7 PG (ref 26.6–33)
MCHC RBC AUTO-ENTMCNC: 34.1 G/DL (ref 31.5–35.7)
MCV RBC AUTO: 95.8 FL (ref 79–97)
MONOCYTES # BLD AUTO: 0.37 10*3/MM3 (ref 0.1–0.9)
MONOCYTES NFR BLD AUTO: 8.5 % (ref 5–12)
NEUTROPHILS NFR BLD AUTO: 1.83 10*3/MM3 (ref 1.7–7)
NEUTROPHILS NFR BLD AUTO: 41.9 % (ref 42.7–76)
NRBC BLD AUTO-RTO: 0 /100 WBC (ref 0–0.2)
PLATELET # BLD AUTO: 252 10*3/MM3 (ref 140–450)
PMV BLD AUTO: 9.8 FL (ref 6–12)
RBC # BLD AUTO: 4.28 10*6/MM3 (ref 4.14–5.8)
RETICS # AUTO: 0.08 10*6/MM3 (ref 0.02–0.13)
RETICS/RBC NFR AUTO: 1.78 % (ref 0.7–1.9)
TIBC SERPL-MCNC: 426 MCG/DL (ref 298–536)
TRANSFERRIN SERPL-MCNC: 286 MG/DL (ref 200–360)
WBC NRBC COR # BLD AUTO: 4.37 10*3/MM3 (ref 3.4–10.8)

## 2024-06-11 PROCEDURE — 84466 ASSAY OF TRANSFERRIN: CPT | Performed by: INTERNAL MEDICINE

## 2024-06-11 PROCEDURE — 36415 COLL VENOUS BLD VENIPUNCTURE: CPT

## 2024-06-11 PROCEDURE — 83540 ASSAY OF IRON: CPT | Performed by: INTERNAL MEDICINE

## 2024-06-11 PROCEDURE — 82728 ASSAY OF FERRITIN: CPT | Performed by: INTERNAL MEDICINE

## 2024-06-11 PROCEDURE — 85025 COMPLETE CBC W/AUTO DIFF WBC: CPT | Performed by: INTERNAL MEDICINE

## 2024-06-11 PROCEDURE — 85046 RETICYTE/HGB CONCENTRATE: CPT | Performed by: INTERNAL MEDICINE

## 2024-06-13 ENCOUNTER — OFFICE VISIT (OUTPATIENT)
Dept: ONCOLOGY | Facility: CLINIC | Age: 34
End: 2024-06-13
Payer: COMMERCIAL

## 2024-06-13 VITALS
HEIGHT: 63 IN | WEIGHT: 135.3 LBS | BODY MASS INDEX: 23.97 KG/M2 | HEART RATE: 64 BPM | RESPIRATION RATE: 16 BRPM | DIASTOLIC BLOOD PRESSURE: 73 MMHG | TEMPERATURE: 97.7 F | OXYGEN SATURATION: 99 % | SYSTOLIC BLOOD PRESSURE: 119 MMHG

## 2024-06-13 DIAGNOSIS — D50.0 IRON DEFICIENCY ANEMIA DUE TO CHRONIC BLOOD LOSS: Primary | ICD-10-CM

## 2024-06-13 PROCEDURE — 99214 OFFICE O/P EST MOD 30 MIN: CPT | Performed by: INTERNAL MEDICINE

## 2024-06-13 PROCEDURE — 1126F AMNT PAIN NOTED NONE PRSNT: CPT | Performed by: INTERNAL MEDICINE

## 2024-06-13 NOTE — PROGRESS NOTES
.     REASON FOR FOLLOWUP :   Iron deficiency anemia    HISTORY OF PRESENT ILLNESS:  The patient is a 33 y.o. year old male  who is here for follow-up with the above-mentioned history.    States he has much more energy after correcting his iron.  No bleeding.  No chest pain or SOA    Past Medical History:   Diagnosis Date    History of iron deficiency anemia     Ulcerative colitis 2023     Past Surgical History:   Procedure Laterality Date    COLONOSCOPY N/A 2023    Procedure: COLONOSCOPY TO CECUM/TI WITH COLD BIOPSIES THROUGHOUT;  Surgeon: Jovanni Carrera MD;  Location: [facility] ENDOSCOPY;  Service: Gastroenterology;  Laterality: N/A;  pre: HEMATOCHEZIA  post: PAN -COLITIS    ENDOSCOPY N/A 2023    Procedure: ESOPHAGOGASTRODUODENOSCOPY WITH BIOPSIES;  Surgeon: Jovanni Carrera MD;  Location: [facility] ENDOSCOPY;  Service: Gastroenterology;  Laterality: N/A;  pre: MELENA  post: MILD ESOPHAGITIS, GASTRITIS       MEDICATIONS    Current Outpatient Medications:     omeprazole (priLOSEC) 40 MG capsule, Take 1 capsule by mouth Daily., Disp: 90 capsule, Rfl: 1    Upadacitinib ER (Rinvoq) 30 MG tablet sustained-release 24 hour, Take 1 tablet by mouth Daily., Disp: 30 tablet, Rfl: 10    upadacitinib ER (Rinvoq) 45 MG tablet sustained-release 24 hour extended release tablet, Take 1 tablet by mouth Daily. (Patient not taking: Reported on 2024), Disp: 28 tablet, Rfl: 1    ALLERGIES:   No Known Allergies    SOCIAL HISTORY:       Social History     Socioeconomic History    Marital status: Single   Tobacco Use    Smoking status: Former     Current packs/day: 0.00     Average packs/day: 0.5 packs/day for 1 year (0.5 ttl pk-yrs)     Types: Cigarettes     Start date: 2019     Quit date: 2020     Years since quittin.4     Passive exposure: Past    Smokeless tobacco: Never   Vaping Use    Vaping status: Never Used   Substance and Sexual Activity    Alcohol use: Never    Drug use: Yes      "Types: Marijuana     Comment: once a day    Sexual activity: Defer         FAMILY HISTORY:  Family History   Problem Relation Age of Onset    Cirrhosis Mother        REVIEW OF SYSTEMS:  Review of Systems   Constitutional:  Negative for activity change.   HENT:  Negative for nosebleeds and trouble swallowing.    Respiratory:  Negative for shortness of breath and wheezing.    Cardiovascular:  Negative for chest pain and palpitations.   Gastrointestinal:  Negative for constipation, diarrhea and nausea.   Genitourinary:  Negative for dysuria and hematuria.   Musculoskeletal:  Negative for arthralgias and myalgias.   Neurological:  Negative for seizures and syncope.   Hematological:  Negative for adenopathy. Does not bruise/bleed easily.   Psychiatric/Behavioral:  Negative for confusion.               Vitals:    06/13/24 1015   BP: 119/73   Pulse: 64   Resp: 16   Temp: 97.7 °F (36.5 °C)   TempSrc: Oral   SpO2: 99%   Weight: 61.4 kg (135 lb 4.8 oz)   Height: 160 cm (62.99\")   PainSc: 0-No pain         6/13/2024    10:16 AM   Current Status   ECOG score 0      PHYSICAL EXAM:        CONSTITUTIONAL:  Vital signs reviewed.  No distress, looks comfortable.  EYES:  Conjunctiva and lids unremarkable.  PERRLA  EARS,NOSE,MOUTH,THROAT:  Ears and nose appear unremarkable.  Lips, teeth, gums appear unremarkable.  RESPIRATORY:  Normal respiratory effort.  Lungs clear to auscultation bilaterally.  CARDIOVASCULAR:  Normal S1, S2.  No murmurs rubs or gallops.  No significant lower extremity edema.  GASTROINTESTINAL: Abdomen appears unremarkable.  Nontender.  No hepatomegaly.  No splenomegaly.  LYMPHATIC:  No cervical, supraclavicular, axillary lymphadenopathy.  SKIN:  Warm.  No rashes.  PSYCHIATRIC:  Normal judgment and insight.  Normal mood and affect.       RECENT LABS:        WBC   Date Value Ref Range Status   06/11/2024 4.37 3.40 - 10.80 10*3/mm3 Final   03/20/2024 4.49 3.40 - 10.80 10*3/mm3 Final   12/01/2023 8.58 3.40 - 10.80 " 10*3/mm3 Final   11/21/2023 7.25 3.40 - 10.80 10*3/mm3 Final   10/26/2023 8.20 3.40 - 10.80 10*3/mm3 Final   10/19/2023 8.6 3.4 - 10.8 x10E3/uL Final   08/18/2023 9.54 3.40 - 10.80 10*3/mm3 Final   07/27/2023 8.1 3.4 - 10.8 x10E3/uL Final   07/21/2023 5.20 3.40 - 10.80 10*3/mm3 Final   07/02/2023 9.35 3.40 - 10.80 10*3/mm3 Final   07/01/2023 7.61 3.40 - 10.80 10*3/mm3 Final   06/30/2023 6.71 3.40 - 10.80 10*3/mm3 Final   06/29/2023 5.88 3.40 - 10.80 10*3/mm3 Final   06/28/2023 7.62 3.40 - 10.80 10*3/mm3 Final   06/27/2023 8.29 3.40 - 10.80 10*3/mm3 Final     Hemoglobin   Date Value Ref Range Status   06/11/2024 14.0 13.0 - 17.7 g/dL Final   03/20/2024 12.5 (L) 13.0 - 17.7 g/dL Final   12/01/2023 12.5 (L) 13.0 - 17.7 g/dL Final   11/21/2023 12.2 (L) 13.0 - 17.7 g/dL Final   10/26/2023 8.8 (L) 13.0 - 17.7 g/dL Final   10/19/2023 8.8 (L) 13.0 - 17.7 g/dL Final   08/18/2023 12.0 (L) 13.0 - 17.7 g/dL Final   07/27/2023 13.4 13.0 - 17.7 g/dL Final   07/21/2023 13.0 13.0 - 17.7 g/dL Final   07/02/2023 12.6 (L) 13.0 - 17.7 g/dL Final   07/01/2023 12.7 (L) 13.0 - 17.7 g/dL Final   06/30/2023 11.7 (L) 13.0 - 17.7 g/dL Final   06/29/2023 11.6 (L) 13.0 - 17.7 g/dL Final   06/28/2023 12.5 (L) 13.0 - 17.7 g/dL Final   06/27/2023 14.5 13.0 - 17.7 g/dL Final     Platelets   Date Value Ref Range Status   06/11/2024 252 140 - 450 10*3/mm3 Final   03/20/2024 214 140 - 450 10*3/mm3 Final   12/01/2023 326 140 - 450 10*3/mm3 Final   11/21/2023 338 140 - 450 10*3/mm3 Final   10/26/2023 499 (H) 140 - 450 10*3/mm3 Final   10/19/2023 563 (H) 150 - 450 x10E3/uL Final   08/18/2023 393 140 - 450 10*3/mm3 Final   07/27/2023 247 150 - 450 x10E3/uL Final   07/21/2023 198 140 - 450 10*3/mm3 Final   07/02/2023 247 140 - 450 10*3/mm3 Final   07/01/2023 220 140 - 450 10*3/mm3 Final   06/30/2023 189 140 - 450 10*3/mm3 Final   06/29/2023 186 140 - 450 10*3/mm3 Final   06/28/2023 210 140 - 450 10*3/mm3 Final   06/27/2023 218 140 - 450 10*3/mm3 Final        Assessment & Plan   Iron deficiency anemia due to chronic blood loss  - Ferritin  - Iron Profile  - Retic With IRF & RET-He  - CBC & Differential        Magdy Guerrero   *Iron deficiency anemia  Begin ferrous sulfate twice per day 10/20/2023.  10/26/2023 Hb 8.8, unchanged from 10/19/2023.  No GI side effects yet from ferrous sulfate.  Therefore, continue this for another month or so and reassess to see if it is working.  He will call us if he becomes intolerant to oral iron in which case we will arrange IV iron.  11/21/2023: Ferritin 30, 7% saturation, Hb 12.2.  B12 459.  Serum folate >20.  12/1/2023: Hb 12.5.  Cannot tolerate oral iron due to abdominal pain.  Arrange Injectafer x 2 doses.  If insurance will not cover Injectafer then arrange Venofer 200 mg x 5 doses.    3/20/2024: Ferritin 150, 7% saturation, Hb 12.5, reticulated Hb 31.6.  Arrange Venofer 200 mg x 3 doses  6/11/2024: Ferritin 147, 26% saturation, Hb 14.  No need for IV iron currently    *Possible allergy to ferrous sulfate  12/1/2023: Red raised rash bilateral arms and chest.  He states this has been present since beginning oral iron.  Premedicine's for IV iron: Pepcid 20 mg IV, Benadryl 25 mg IV, Solu-Cortef 40 mg IV, Tylenol 650 mg oral    *Source of iron deficiency  Follows with GI for ulcerative colitis    *Ulcerative colitis    *Microcytosis, likely due to iron deficiency.  MCV 95.8    *Thrombocytosis, likely due to iron deficiency      Plan  No need for Venofer currently  Because of a red raised rash that may have been caused by oral ferrous sulfate, premedicine's for IV iron:   Pepcid 20 mg IV, Benadryl 25 mg IV, Solu-Cortef 40 mg IV, Tylenol 650 mg oral  MD 4 months.  At least 1 day prior: Ferritin, iron panel, CBC, reticulate hemoglobin

## 2024-06-18 ENCOUNTER — OFFICE VISIT (OUTPATIENT)
Dept: GASTROENTEROLOGY | Facility: CLINIC | Age: 34
End: 2024-06-18
Payer: COMMERCIAL

## 2024-06-18 VITALS
SYSTOLIC BLOOD PRESSURE: 112 MMHG | DIASTOLIC BLOOD PRESSURE: 68 MMHG | HEIGHT: 63 IN | TEMPERATURE: 97.8 F | HEART RATE: 63 BPM | WEIGHT: 136.6 LBS | BODY MASS INDEX: 24.2 KG/M2

## 2024-06-18 DIAGNOSIS — K51.011 ULCERATIVE PANCOLITIS WITH RECTAL BLEEDING: Primary | ICD-10-CM

## 2024-06-18 PROCEDURE — 1159F MED LIST DOCD IN RCRD: CPT | Performed by: INTERNAL MEDICINE

## 2024-06-18 PROCEDURE — 99213 OFFICE O/P EST LOW 20 MIN: CPT | Performed by: INTERNAL MEDICINE

## 2024-06-18 PROCEDURE — 1160F RVW MEDS BY RX/DR IN RCRD: CPT | Performed by: INTERNAL MEDICINE

## 2024-06-18 NOTE — PROGRESS NOTES
Chief Complaint   Patient presents with    Ulcerative Colitis        Magdy Guerrero is a  34 y.o. male here for a follow up visit for the pancolitis    HPI this 34-year-old  male patient of MAYITO Grubbs presents in follow-up since last seen in March of this year.  His ulcerative pancolitis is well-controlled with Rinvoq 30 mg daily, he is having routine bowel movements with only occasional episodes of rectal bleeding usually shown is some blood on the tissue that subsides quickly.  He has been followed by the hematology service because of his iron deficiency anemia and that too is improved.  He had received iron infusions in the past and they are monitoring his numbers.  I advised he continue with his current medical regimen and would anticipate follow-up in 6 months with consideration of colonoscopy in 2025.  He has been instructed to contact this office sooner if he has significant issues with his pancolitis.    Past Medical History:   Diagnosis Date    History of iron deficiency anemia     Ulcerative colitis 2023       Current Outpatient Medications   Medication Sig Dispense Refill    omeprazole (priLOSEC) 40 MG capsule Take 1 capsule by mouth Daily. 90 capsule 1    Upadacitinib ER (Rinvoq) 30 MG tablet sustained-release 24 hour Take 1 tablet by mouth Daily. 30 tablet 10     No current facility-administered medications for this visit.       PRN Meds:.    No Known Allergies    Social History     Socioeconomic History    Marital status: Single   Tobacco Use    Smoking status: Former     Current packs/day: 0.00     Average packs/day: 0.5 packs/day for 1 year (0.5 ttl pk-yrs)     Types: Cigarettes     Start date: 2019     Quit date: 2020     Years since quittin.4     Passive exposure: Past    Smokeless tobacco: Never   Vaping Use    Vaping status: Never Used   Substance and Sexual Activity    Alcohol use: Never    Drug use: Yes     Types: Marijuana     Comment: once a day     Sexual activity: Defer       Family History   Problem Relation Age of Onset    Cirrhosis Mother        Review of Systems   Constitutional:  Negative for activity change, appetite change, fatigue and unexpected weight change.   HENT:  Negative for congestion, facial swelling, sore throat, trouble swallowing and voice change.    Eyes:  Negative for photophobia and visual disturbance.   Respiratory:  Negative for cough and choking.    Cardiovascular:  Negative for chest pain.   Gastrointestinal:  Positive for blood in stool and diarrhea. Negative for abdominal distention, abdominal pain, anal bleeding, constipation, nausea, rectal pain and vomiting.   Endocrine: Negative for polyphagia.   Musculoskeletal:  Negative for arthralgias, gait problem and joint swelling.   Skin:  Negative for color change, pallor and rash.   Allergic/Immunologic: Negative for food allergies.   Neurological:  Negative for speech difficulty and headaches.   Hematological:  Does not bruise/bleed easily.   Psychiatric/Behavioral:  Negative for agitation, confusion and sleep disturbance.        Vitals:    06/18/24 1039   BP: 112/68   Pulse: 63   Temp: 97.8 °F (36.6 °C)       Physical Exam  Constitutional:       Appearance: He is well-developed.   HENT:      Head: Normocephalic.   Eyes:      Conjunctiva/sclera: Conjunctivae normal.   Cardiovascular:      Rate and Rhythm: Normal rate and regular rhythm.   Pulmonary:      Breath sounds: Normal breath sounds.   Abdominal:      General: Bowel sounds are normal.      Palpations: Abdomen is soft.   Musculoskeletal:         General: Normal range of motion.      Cervical back: Normal range of motion.   Skin:     General: Skin is warm and dry.   Neurological:      Mental Status: He is alert and oriented to person, place, and time.   Psychiatric:         Behavior: Behavior normal.         ASSESSMENT   #1 ulcerative pancolitis: Controlled with current medical regimen  #2 iron deficiency anemia: Improved,  monitored by the hematology service      PLAN  Continue current medical regimen  Office follow-up in 6 months  Consider colonoscopic examination in June 2025      ICD-10-CM ICD-9-CM   1. Ulcerative pancolitis with rectal bleeding  K51.011 556.6

## 2024-07-01 RX ORDER — OMEPRAZOLE 40 MG/1
40 CAPSULE, DELAYED RELEASE ORAL DAILY
Qty: 90 CAPSULE | Refills: 1 | Status: SHIPPED | OUTPATIENT
Start: 2024-07-01

## 2024-07-08 ENCOUNTER — SPECIALTY PHARMACY (OUTPATIENT)
Dept: GASTROENTEROLOGY | Facility: CLINIC | Age: 34
End: 2024-07-08
Payer: COMMERCIAL

## 2024-07-08 NOTE — PROGRESS NOTES
Specialty Pharmacy     Rinvoq PA approved  Key: [id number]  PA Case ID #: [id number]  07/08/2024 to 07/08/2025     Ela Quiroga  Specialty Pharmacy Technician

## 2024-07-15 ENCOUNTER — SPECIALTY PHARMACY (OUTPATIENT)
Dept: GASTROENTEROLOGY | Facility: CLINIC | Age: 34
End: 2024-07-15
Payer: COMMERCIAL

## 2024-07-15 NOTE — PROGRESS NOTES
Specialty Pharmacy Patient Management Program  Gastroenterology Reassessment     Magdy Guerrero is a 34 y.o. male seen by a Gastroenterology provider for Ulcerative Colitis and enrolled in the Patient Management program offered by [facility]. A follow-up outreach was conducted, including assessment of continued therapy appropriateness, medication adherence, and side effect incidence and management for Rinvoq (upadacitinib).     Changes to Insurance Coverage or Financial Support  none    Relevant Past Medical History and Comorbidities  Relevant medical history and concomitant health conditions were discussed with the patient. The patient's chart has been reviewed for relevant past medical history and comorbid health conditions and updated as necessary.   Past Medical History:   Diagnosis Date    History of iron deficiency anemia     Ulcerative colitis 2023     Social History     Socioeconomic History    Marital status: Single   Tobacco Use    Smoking status: Former     Current packs/day: 0.00     Average packs/day: 0.5 packs/day for 1 year (0.5 ttl pk-yrs)     Types: Cigarettes     Start date: 2019     Quit date: 2020     Years since quittin.5     Passive exposure: Past    Smokeless tobacco: Never   Vaping Use    Vaping status: Never Used   Substance and Sexual Activity    Alcohol use: Never    Drug use: Yes     Types: Marijuana     Comment: once a day    Sexual activity: Defer     Problem list reviewed by Erin Hilton, Ton on 7/15/2024 at  1:53 PM    Allergies  Known allergies and reactions were discussed with the patient. The patient's chart has been reviewed for allergy information and updated as necessary.   Patient has no known allergies.  Allergies reviewed by Erin Hilton, Ton on 7/15/2024 at  1:53 PM    Relevant Laboratory Values  Lab Results   Component Value Date    GLUCOSE 98 10/19/2023    BUN 6 10/19/2023    CREATININE 0.69 (L) 10/19/2023    BCR 9  10/19/2023     10/19/2023    K 3.9 10/19/2023     10/19/2023    CO2 21 10/19/2023    CALCIUM 9.0 10/19/2023    PROTEINTOT 6.3 07/21/2023    ALBUMIN 3.7 (L) 10/19/2023    ALT 18 10/19/2023    AST 15 10/19/2023    ALKPHOS 158 (H) 10/19/2023    BILITOT <0.2 10/19/2023    ANIONGAP 9.0 07/21/2023    PHOS 4.2 07/02/2023    MG 2.0 07/02/2023    EGFRRESULT 127 10/19/2023     Lab Results   Component Value Date    WBC 4.37 06/11/2024    HGB 14.0 06/11/2024    HCT 41.0 06/11/2024     06/11/2024     Lab Results   Component Value Date    HAV Positive (A) 07/27/2023    HEPAIGM Non-Reactive 06/27/2023    HEPBIGMCORE Non-Reactive 06/27/2023    HEPBSAB Reactive 07/27/2023    HEPBSAG Non-Reactive 01/22/2024    HEPCVIRUSABY Non-Reactive 06/27/2023     Lab Results   Component Value Date    QUANTITBGLDP Negative 01/22/2024    QUANTITBGLDP Negative 07/27/2023     Lab Value Review  The above lab values have been reviewed; the following specialty medication(s) dose adjustment(s) are recommended: none.    Current Medication List  This medication list has been reviewed with the patient and evaluated for any interactions or necessary modifications/recommendations, and updated to include all prescription medications, OTC medications, and supplements the patient is currently taking. This list reflects what is contained in the patient's profile, which has also been marked as reviewed to communicate to other providers it is the most up to date version of the patient's current medication therapy.     Current Outpatient Medications:     omeprazole (priLOSEC) 40 MG capsule, Take 1 capsule by mouth Daily., Disp: 90 capsule, Rfl: 1    Upadacitinib ER (Rinvoq) 30 MG tablet sustained-release 24 hour, Take 1 tablet by mouth Daily., Disp: 30 tablet, Rfl: 10  Medicines reviewed by Erin Hilton, PharmD on 7/15/2024 at  1:53 PM    Drug Interactions  none     Adverse Drug Reactions  Adverse Reactions Experienced: none  Plan for ADR  Management: N/A     Hospitalizations and Urgent Care Since Last Assessment  Hospitalizations or Admissions: none  ED Visits: none  Urgent Office Visits: none     Adherence and Self-Administration  Approximate Number of Doses Missed Since Last Assessment: none  Ongoing or New Barriers to Patient Adherence and/or Self-Administration: none   Methods for Supporting Patient Adherence and/or Self-Administration: N/A     Recently Close Medication Therapy Problems  No medication therapy recommendations to display    Goals of Therapy  Goals related to the patient's specialty therapy were discussed with the patient. The Patient Goals segment of this outreach has been reviewed and updated.   Goals Addressed Today        Specialty Pharmacy General Goal      At least 50% symptom reduction and mucosal healing               Quality of Life Assessment   Quality of Life related to the patient's specialty therapy was discussed with the patient. The QOL segment of this outreach has been reviewed and updated.   Quality of Life Assessment  Quality of Life Improvement Scale: 8-Moderately better  Comments on Quality of Life: doing very well, tolerating w/o side effects. also being followed by Heme/Onc.    Reassessment Plan & Follow-Up  Medication Therapy Changes: none  Additional Plans, Therapy Recommendations, or Therapy Problems to Be Addressed: none   Pharmacist to perform regular reassessments no more than (6) months from the previous assessment.  Care Coordinator to set up future refill outreaches, coordinate prescription delivery, and escalate clinical questions to pharmacist.     Attestation  Therapeutic appropriateness: Appropriate   I attest the patient was actively involved in and has agreed to the above plan of care. I attest that the specialty medication(s) addressed above are appropriate for the patient based on my reassessment. If the prescribed therapy is at any point deemed not appropriate based on the current or future  assessments, a consultation will be initiated with the patient's specialty care provider to determine the best course of action. The revised plan of therapy will be documented along with any required assessments and/or additional patient education provided.     Erin Hilotn, PharmD, BCACP, BCPS, BCCCP  Clinical Specialty Pharmacist, Gastroenterology  07/15/24 13:55 EDT

## 2024-08-26 ENCOUNTER — SPECIALTY PHARMACY (OUTPATIENT)
Dept: GASTROENTEROLOGY | Facility: CLINIC | Age: 34
End: 2024-08-26
Payer: COMMERCIAL

## 2024-08-26 NOTE — PROGRESS NOTES
Specialty Pharmacy Patient Management Program  Gastroenterology Refill Outreach      Magdy is a 34 y.o. male contacted today regarding refills of his medication(s).    Specialty medication(s) and dose(s) confirmed: rinvoq    Refill Questions      Flowsheet Row Most Recent Value   Changes to allergies? No   Changes to medications? No   New conditions or infections since last clinic visit No   Unplanned office visit, urgent care, ED, or hospital admission in the last 4 weeks  No   How does patient/caregiver feel medication is working? Very good   Financial problems or insurance changes  No   Since the previous refill, were any specialty medication doses or scheduled injections missed or delayed?  No   Does this patient require a clinical escalation to a pharmacist? No          Delivery Questions      Flowsheet Row Most Recent Value   Delivery method [employer]   Delivery address verified with patient/caregiver? Yes   Delivery address Home   Number of medications in delivery 1   Medication(s) being filled and delivered Upadacitinib   Doses left of specialty medications 1 week   Copay verified? Yes   Copay amount 0   Copay form of payment No copayment ($0)   Ship Date 8/29   Delivery Date 8/30   Signature Required Yes            Follow-Up: 83 days    Ela Quiroga  Specialty Pharmacy Technician

## 2024-08-26 NOTE — PROGRESS NOTES
Specialty Pharmacy     Requested refill at [facility] - sent email to Magdy Quiroga  Specialty Pharmacy Technician

## 2024-10-07 ENCOUNTER — SPECIALTY PHARMACY (OUTPATIENT)
Dept: GASTROENTEROLOGY | Facility: CLINIC | Age: 34
End: 2024-10-07
Payer: COMMERCIAL

## 2024-10-07 NOTE — PROGRESS NOTES
Specialty Pharmacy Patient Management Program  Gastroenterology Refill Outreach      Magdy is a 34 y.o. male contacted today regarding refills of his medication(s).    Specialty medication(s) and dose(s) confirmed: rinvoq    Refill Questions      Flowsheet Row Most Recent Value   Changes to allergies? No   Changes to medications? No   New conditions or infections since last clinic visit No   Unplanned office visit, urgent care, ED, or hospital admission in the last 4 weeks  No   How does patient/caregiver feel medication is working? Very good   Financial problems or insurance changes  No   Since the previous refill, were any specialty medication doses or scheduled injections missed or delayed?  No   Does this patient require a clinical escalation to a pharmacist? No          Delivery Questions      Flowsheet Row Most Recent Value   Delivery method [employer]   Delivery address verified with patient/caregiver? Yes   Delivery address Home   Number of medications in delivery 1   Medication(s) being filled and delivered Upadacitinib   Doses left of specialty medications 1 week   Copay verified? Yes   Copay amount 0   Copay form of payment No copayment ($0)   Ship Date 10/10   Delivery Date 10/11   Signature Required Yes            Follow-Up: 21 days    Ela Quiroga  10/7/2024  10:01 EDT

## 2024-10-08 RX ORDER — OMEPRAZOLE 40 MG/1
40 CAPSULE, DELAYED RELEASE ORAL DAILY
Qty: 90 CAPSULE | Refills: 1 | Status: SHIPPED | OUTPATIENT
Start: 2024-10-08

## 2024-10-16 ENCOUNTER — LAB (OUTPATIENT)
Dept: OTHER | Facility: HOSPITAL | Age: 34
End: 2024-10-16
Payer: COMMERCIAL

## 2024-10-16 DIAGNOSIS — D50.0 IRON DEFICIENCY ANEMIA DUE TO CHRONIC BLOOD LOSS: ICD-10-CM

## 2024-10-16 LAB
BASOPHILS # BLD AUTO: 0.01 10*3/MM3 (ref 0–0.2)
BASOPHILS NFR BLD AUTO: 0.2 % (ref 0–1.5)
DEPRECATED RDW RBC AUTO: 42 FL (ref 37–54)
EOSINOPHIL # BLD AUTO: 0.08 10*3/MM3 (ref 0–0.4)
EOSINOPHIL NFR BLD AUTO: 1.3 % (ref 0.3–6.2)
ERYTHROCYTE [DISTWIDTH] IN BLOOD BY AUTOMATED COUNT: 11.9 % (ref 12.3–15.4)
FERRITIN SERPL-MCNC: 139.2 NG/ML (ref 30–400)
HCT VFR BLD AUTO: 41.5 % (ref 37.5–51)
HGB BLD-MCNC: 14.3 G/DL (ref 13–17.7)
HGB RETIC QN AUTO: 35.8 PG (ref 29.8–36.1)
IMM GRANULOCYTES # BLD AUTO: 0.01 10*3/MM3 (ref 0–0.05)
IMM GRANULOCYTES NFR BLD AUTO: 0.2 % (ref 0–0.5)
IMM RETICS NFR: 7.9 % (ref 3–15.8)
IRON 24H UR-MRATE: 140 MCG/DL (ref 59–158)
IRON SATN MFR SERPL: 33 % (ref 20–50)
LYMPHOCYTES # BLD AUTO: 2.07 10*3/MM3 (ref 0.7–3.1)
LYMPHOCYTES NFR BLD AUTO: 34.8 % (ref 19.6–45.3)
MCH RBC QN AUTO: 33 PG (ref 26.6–33)
MCHC RBC AUTO-ENTMCNC: 34.5 G/DL (ref 31.5–35.7)
MCV RBC AUTO: 95.8 FL (ref 79–97)
MONOCYTES # BLD AUTO: 0.56 10*3/MM3 (ref 0.1–0.9)
MONOCYTES NFR BLD AUTO: 9.4 % (ref 5–12)
NEUTROPHILS NFR BLD AUTO: 3.22 10*3/MM3 (ref 1.7–7)
NEUTROPHILS NFR BLD AUTO: 54.1 % (ref 42.7–76)
NRBC BLD AUTO-RTO: 0 /100 WBC (ref 0–0.2)
PLATELET # BLD AUTO: 249 10*3/MM3 (ref 140–450)
PMV BLD AUTO: 10.5 FL (ref 6–12)
RBC # BLD AUTO: 4.33 10*6/MM3 (ref 4.14–5.8)
RETICS # AUTO: 0.06 10*6/MM3 (ref 0.02–0.13)
RETICS/RBC NFR AUTO: 1.47 % (ref 0.7–1.9)
TIBC SERPL-MCNC: 431 MCG/DL (ref 298–536)
TRANSFERRIN SERPL-MCNC: 289 MG/DL (ref 200–360)
WBC NRBC COR # BLD AUTO: 5.95 10*3/MM3 (ref 3.4–10.8)

## 2024-10-16 PROCEDURE — 82728 ASSAY OF FERRITIN: CPT | Performed by: INTERNAL MEDICINE

## 2024-10-16 PROCEDURE — 84466 ASSAY OF TRANSFERRIN: CPT | Performed by: INTERNAL MEDICINE

## 2024-10-16 PROCEDURE — 85046 RETICYTE/HGB CONCENTRATE: CPT | Performed by: INTERNAL MEDICINE

## 2024-10-16 PROCEDURE — 85025 COMPLETE CBC W/AUTO DIFF WBC: CPT | Performed by: INTERNAL MEDICINE

## 2024-10-16 PROCEDURE — 83540 ASSAY OF IRON: CPT | Performed by: INTERNAL MEDICINE

## 2024-10-16 PROCEDURE — 36415 COLL VENOUS BLD VENIPUNCTURE: CPT

## 2024-10-17 ENCOUNTER — OFFICE VISIT (OUTPATIENT)
Dept: ONCOLOGY | Facility: CLINIC | Age: 34
End: 2024-10-17
Payer: COMMERCIAL

## 2024-10-17 VITALS
BODY MASS INDEX: 25.21 KG/M2 | HEIGHT: 63 IN | HEART RATE: 67 BPM | OXYGEN SATURATION: 97 % | TEMPERATURE: 97.8 F | RESPIRATION RATE: 16 BRPM | DIASTOLIC BLOOD PRESSURE: 77 MMHG | SYSTOLIC BLOOD PRESSURE: 136 MMHG | WEIGHT: 142.3 LBS

## 2024-10-17 DIAGNOSIS — D50.0 IRON DEFICIENCY ANEMIA DUE TO CHRONIC BLOOD LOSS: Primary | ICD-10-CM

## 2024-10-17 PROCEDURE — 99214 OFFICE O/P EST MOD 30 MIN: CPT | Performed by: INTERNAL MEDICINE

## 2024-10-17 PROCEDURE — 1126F AMNT PAIN NOTED NONE PRSNT: CPT | Performed by: INTERNAL MEDICINE

## 2024-10-17 NOTE — PROGRESS NOTES
.     REASON FOR FOLLOWUP :   Iron deficiency anemia    HISTORY OF PRESENT ILLNESS:  The patient is a 34 y.o. year old male  who is here for follow-up with the above-mentioned history.    No bleeding.  Feeling good.  Ulcerative colitis well-controlled.  No new problems    Past Medical History:   Diagnosis Date    History of iron deficiency anemia     Ulcerative colitis 2023     Past Surgical History:   Procedure Laterality Date    COLONOSCOPY N/A 2023    Procedure: COLONOSCOPY TO CECUM/TI WITH COLD BIOPSIES THROUGHOUT;  Surgeon: Jovanni Carrera MD;  Location: [facility] ENDOSCOPY;  Service: Gastroenterology;  Laterality: N/A;  pre: HEMATOCHEZIA  post: PAN -COLITIS    ENDOSCOPY N/A 2023    Procedure: ESOPHAGOGASTRODUODENOSCOPY WITH BIOPSIES;  Surgeon: Jovanni Carrera MD;  Location: [facility] ENDOSCOPY;  Service: Gastroenterology;  Laterality: N/A;  pre: MELENA  post: MILD ESOPHAGITIS, GASTRITIS       MEDICATIONS    Current Outpatient Medications:     omeprazole (priLOSEC) 40 MG capsule, Take 1 capsule by mouth Daily., Disp: 90 capsule, Rfl: 1    Upadacitinib ER (Rinvoq) 30 MG tablet sustained-release 24 hour, Take 1 tablet by mouth Daily., Disp: 30 tablet, Rfl: 10    ALLERGIES:   No Known Allergies    SOCIAL HISTORY:       Social History     Socioeconomic History    Marital status: Single   Tobacco Use    Smoking status: Former     Current packs/day: 0.00     Average packs/day: 0.5 packs/day for 1 year (0.5 ttl pk-yrs)     Types: Cigarettes     Start date: 2019     Quit date: 2020     Years since quittin.7     Passive exposure: Past    Smokeless tobacco: Never   Vaping Use    Vaping status: Never Used   Substance and Sexual Activity    Alcohol use: Never    Drug use: Yes     Types: Marijuana     Comment: once a day    Sexual activity: Defer         FAMILY HISTORY:  Family History   Problem Relation Age of Onset    Cirrhosis Mother        REVIEW OF SYSTEMS:  Review of Systems  "  Constitutional:  Negative for activity change.   HENT:  Negative for nosebleeds and trouble swallowing.    Respiratory:  Negative for shortness of breath and wheezing.    Cardiovascular:  Negative for chest pain and palpitations.   Gastrointestinal:  Negative for constipation, diarrhea and nausea.   Genitourinary:  Negative for dysuria and hematuria.   Musculoskeletal:  Negative for arthralgias and myalgias.   Neurological:  Negative for seizures and syncope.   Hematological:  Negative for adenopathy. Does not bruise/bleed easily.   Psychiatric/Behavioral:  Negative for confusion.               Vitals:    10/17/24 0927   BP: 136/77   Pulse: 67   Resp: 16   Temp: 97.8 °F (36.6 °C)   TempSrc: Oral   SpO2: 97%   Weight: 64.5 kg (142 lb 4.8 oz)   Height: 160 cm (62.99\")   PainSc: 0-No pain         10/17/2024     9:28 AM   Current Status   ECOG score 0      PHYSICAL EXAM:        CONSTITUTIONAL:  Vital signs reviewed.  No distress, looks comfortable.  EYES:  Conjunctiva and lids unremarkable.  PERRLA  EARS,NOSE,MOUTH,THROAT:  Ears and nose appear unremarkable.  Lips, teeth, gums appear unremarkable.  RESPIRATORY:  Normal respiratory effort.  Lungs clear to auscultation bilaterally.  CARDIOVASCULAR:  Normal S1, S2.  No murmurs rubs or gallops.  No significant lower extremity edema.  GASTROINTESTINAL: Abdomen appears unremarkable.  Nontender.  No hepatomegaly.  No splenomegaly.  LYMPHATIC:  No cervical, supraclavicular, axillary lymphadenopathy.  SKIN:  Warm.  No rashes.  PSYCHIATRIC:  Normal judgment and insight.  Normal mood and affect.     RECENT LABS:        WBC   Date Value Ref Range Status   10/16/2024 5.95 3.40 - 10.80 10*3/mm3 Final   06/11/2024 4.37 3.40 - 10.80 10*3/mm3 Final   03/20/2024 4.49 3.40 - 10.80 10*3/mm3 Final   12/01/2023 8.58 3.40 - 10.80 10*3/mm3 Final   11/21/2023 7.25 3.40 - 10.80 10*3/mm3 Final   10/26/2023 8.20 3.40 - 10.80 10*3/mm3 Final   10/19/2023 8.6 3.4 - 10.8 x10E3/uL Final   08/18/2023 " 9.54 3.40 - 10.80 10*3/mm3 Final   07/27/2023 8.1 3.4 - 10.8 x10E3/uL Final   07/21/2023 5.20 3.40 - 10.80 10*3/mm3 Final   07/02/2023 9.35 3.40 - 10.80 10*3/mm3 Final   07/01/2023 7.61 3.40 - 10.80 10*3/mm3 Final   06/30/2023 6.71 3.40 - 10.80 10*3/mm3 Final   06/29/2023 5.88 3.40 - 10.80 10*3/mm3 Final   06/28/2023 7.62 3.40 - 10.80 10*3/mm3 Final   06/27/2023 8.29 3.40 - 10.80 10*3/mm3 Final     Hemoglobin   Date Value Ref Range Status   10/16/2024 14.3 13.0 - 17.7 g/dL Final   06/11/2024 14.0 13.0 - 17.7 g/dL Final   03/20/2024 12.5 (L) 13.0 - 17.7 g/dL Final   12/01/2023 12.5 (L) 13.0 - 17.7 g/dL Final   11/21/2023 12.2 (L) 13.0 - 17.7 g/dL Final   10/26/2023 8.8 (L) 13.0 - 17.7 g/dL Final   10/19/2023 8.8 (L) 13.0 - 17.7 g/dL Final   08/18/2023 12.0 (L) 13.0 - 17.7 g/dL Final   07/27/2023 13.4 13.0 - 17.7 g/dL Final   07/21/2023 13.0 13.0 - 17.7 g/dL Final   07/02/2023 12.6 (L) 13.0 - 17.7 g/dL Final   07/01/2023 12.7 (L) 13.0 - 17.7 g/dL Final   06/30/2023 11.7 (L) 13.0 - 17.7 g/dL Final   06/29/2023 11.6 (L) 13.0 - 17.7 g/dL Final   06/28/2023 12.5 (L) 13.0 - 17.7 g/dL Final   06/27/2023 14.5 13.0 - 17.7 g/dL Final     Platelets   Date Value Ref Range Status   10/16/2024 249 140 - 450 10*3/mm3 Final   06/11/2024 252 140 - 450 10*3/mm3 Final   03/20/2024 214 140 - 450 10*3/mm3 Final   12/01/2023 326 140 - 450 10*3/mm3 Final   11/21/2023 338 140 - 450 10*3/mm3 Final   10/26/2023 499 (H) 140 - 450 10*3/mm3 Final   10/19/2023 563 (H) 150 - 450 x10E3/uL Final   08/18/2023 393 140 - 450 10*3/mm3 Final   07/27/2023 247 150 - 450 x10E3/uL Final   07/21/2023 198 140 - 450 10*3/mm3 Final   07/02/2023 247 140 - 450 10*3/mm3 Final   07/01/2023 220 140 - 450 10*3/mm3 Final   06/30/2023 189 140 - 450 10*3/mm3 Final   06/29/2023 186 140 - 450 10*3/mm3 Final   06/28/2023 210 140 - 450 10*3/mm3 Final   06/27/2023 218 140 - 450 10*3/mm3 Final       Assessment & Plan   There are no diagnoses linked to this  encounter.        Magdy Guerrero   *Iron deficiency anemia  Begin ferrous sulfate twice per day 10/20/2023.  10/26/2023 Hb 8.8, unchanged from 10/19/2023.  No GI side effects yet from ferrous sulfate.  Therefore, continue this for another month or so and reassess to see if it is working.  He will call us if he becomes intolerant to oral iron in which case we will arrange IV iron.  11/21/2023: Ferritin 30, 7% saturation, Hb 12.2.  B12 459.  Serum folate >20.  12/1/2023: Hb 12.5.  Cannot tolerate oral iron due to abdominal pain.  Arrange Injectafer x 2 doses.  If insurance will not cover Injectafer then arrange Venofer 200 mg x 5 doses.    3/20/2024: Ferritin 150, 7% saturation, Hb 12.5, reticulated Hb 31.6.  Arrange Venofer 200 mg x 3 doses  6/11/2024: Ferritin 147, 26% saturation, Hb 14.  No need for IV iron currently  10/16/2024: Ferritin 139, 33% saturation, Hb 14.3.  No need for IV iron    *Possible allergy to ferrous sulfate  12/1/2023: Red raised rash bilateral arms and chest.  He states this has been present since beginning oral iron.  Premedicine's for IV iron: Pepcid 20 mg IV, Benadryl 25 mg IV, Solu-Cortef 40 mg IV, Tylenol 650 mg oral    *Source of iron deficiency  Follows with GI for ulcerative colitis    *Ulcerative colitis    *Microcytosis, likely due to iron deficiency.  MCV 95.8    *Thrombocytosis, likely due to iron deficiency      Plan  No need for Venofer currently  Because of a red raised rash that may have been caused by oral ferrous sulfate, premedicine's for IV iron:   Pepcid 20 mg IV, Benadryl 25 mg IV, Solu-Cortef 40 mg IV, Tylenol 650 mg oral  MD 4 months.  At least 1 day prior: Ferritin, iron panel, CBC, reticulate hemoglobin  (I offered 6-month follow-up but he prefers to maintain 4-month follow-up which is fine)

## 2024-12-18 ENCOUNTER — OFFICE VISIT (OUTPATIENT)
Dept: GASTROENTEROLOGY | Facility: CLINIC | Age: 34
End: 2024-12-18
Payer: COMMERCIAL

## 2024-12-18 VITALS
TEMPERATURE: 98.2 F | HEIGHT: 63 IN | DIASTOLIC BLOOD PRESSURE: 83 MMHG | SYSTOLIC BLOOD PRESSURE: 130 MMHG | HEART RATE: 78 BPM | WEIGHT: 138 LBS | BODY MASS INDEX: 24.45 KG/M2

## 2024-12-18 DIAGNOSIS — K51.00 ULCERATIVE PANCOLITIS WITHOUT COMPLICATION: Primary | ICD-10-CM

## 2024-12-18 LAB
25(OH)D3+25(OH)D2 SERPL-MCNC: 21.8 NG/ML (ref 30–100)
ALBUMIN SERPL-MCNC: 4.5 G/DL (ref 3.5–5.2)
ALBUMIN/GLOB SERPL: 1.6 G/DL
ALP SERPL-CCNC: 107 U/L (ref 39–117)
ALT SERPL-CCNC: 22 U/L (ref 1–41)
AST SERPL-CCNC: 23 U/L (ref 1–40)
BILIRUB SERPL-MCNC: <0.2 MG/DL (ref 0–1.2)
BUN SERPL-MCNC: 15 MG/DL (ref 6–20)
BUN/CREAT SERPL: 20 (ref 7–25)
CALCIUM SERPL-MCNC: 9.9 MG/DL (ref 8.6–10.5)
CHLORIDE SERPL-SCNC: 105 MMOL/L (ref 98–107)
CO2 SERPL-SCNC: 24.4 MMOL/L (ref 22–29)
CREAT SERPL-MCNC: 0.75 MG/DL (ref 0.76–1.27)
CRP SERPL-MCNC: <0.3 MG/DL (ref 0–0.5)
EGFRCR SERPLBLD CKD-EPI 2021: 123 ML/MIN/1.73
ERYTHROCYTE [DISTWIDTH] IN BLOOD BY AUTOMATED COUNT: 11.8 % (ref 12.3–15.4)
ERYTHROCYTE [SEDIMENTATION RATE] IN BLOOD BY WESTERGREN METHOD: 14 MM/HR (ref 0–15)
GLOBULIN SER CALC-MCNC: 2.8 GM/DL
GLUCOSE SERPL-MCNC: 96 MG/DL (ref 65–99)
HCT VFR BLD AUTO: 42.3 % (ref 37.5–51)
HGB BLD-MCNC: 14.5 G/DL (ref 13–17.7)
MCH RBC QN AUTO: 33.3 PG (ref 26.6–33)
MCHC RBC AUTO-ENTMCNC: 34.3 G/DL (ref 31.5–35.7)
MCV RBC AUTO: 97.2 FL (ref 79–97)
PLATELET # BLD AUTO: 276 10*3/MM3 (ref 140–450)
POTASSIUM SERPL-SCNC: 4.4 MMOL/L (ref 3.5–5.2)
PROT SERPL-MCNC: 7.3 G/DL (ref 6–8.5)
RBC # BLD AUTO: 4.35 10*6/MM3 (ref 4.14–5.8)
SODIUM SERPL-SCNC: 138 MMOL/L (ref 136–145)
VIT B12 SERPL-MCNC: 593 PG/ML (ref 211–946)
WBC # BLD AUTO: 6.71 10*3/MM3 (ref 3.4–10.8)

## 2024-12-18 NOTE — PROGRESS NOTES
"Chief Complaint  ulcerative pancolitis    Subjective          History Of Present Illness:    Magdy Guerrero is a  34 y.o. male patient of Dr. Carrera who presents as a follow-up for ulcerative pancolitis.    Patient was originally diagnosed with ulcerative colitis in June 2023.  Patient was initially on Humira but showed lack of response and was transitioned to Rinvoq 30 mg daily.    Patient reports he is doing quite well.  Comoran translation provided by his partner Velma as needed.  He remains on Rinvoq 30 mg daily.  He is now off steroids and is doing quite well.  He reports regular formed stools.  No abdominal pain, melena, hematochezia.  Patient previously was suffering from some acne but this has resolved since being off the steroids.  He denies any abnormal weight loss or poor appetite.  He denies any extraintestinal manifestations such as rash, joint pain, eye pain/erythema.  No current issues with Rinvoq therapy.    Additional data reviewed:  Colonoscopy 6/29/2023 - Diffuse severe inflammation within the rectum, rectosigmoid colon, descending colon, transverse colon, ascending colon consistent with ulcerative pancolitis.  EGD 6/29/2023 showed irregular Z-line, mild gastritis.  Pathology with normal duodenum, chronic inactive gastritis, negative H. pylori, reflux esophagitis, negative metaplasia.     Objective   Vital Signs:   /83   Pulse 78   Temp 98.2 °F (36.8 °C)   Ht 160 cm (63\")   Wt 62.6 kg (138 lb)   BMI 24.45 kg/m²       Physical Exam  Vitals reviewed.   Constitutional:       General: He is not in acute distress.     Appearance: Normal appearance. He is not ill-appearing.   HENT:      Head: Normocephalic and atraumatic.      Nose: Nose normal.      Mouth/Throat:      Pharynx: Oropharynx is clear.   Eyes:      Conjunctiva/sclera: Conjunctivae normal.   Pulmonary:      Effort: Pulmonary effort is normal.   Abdominal:      General: Abdomen is flat. Bowel sounds are normal. There is no " distension.      Palpations: Abdomen is soft. There is no mass.      Tenderness: There is no abdominal tenderness.   Musculoskeletal:         General: No swelling. Normal range of motion.      Cervical back: Normal range of motion.   Skin:     General: Skin is warm and dry.      Findings: No bruising or rash.   Neurological:      General: No focal deficit present.      Mental Status: He is alert and oriented to person, place, and time.      Motor: No weakness.      Gait: Gait normal.   Psychiatric:         Mood and Affect: Mood normal.          Result Review :   The following data was reviewed by: Tereza Wallis PA-C on 12/18/2024:    CBC          3/20/2024    09:39 6/11/2024    10:14 10/16/2024    08:57   CBC   WBC 4.49  4.37  5.95    RBC 4.03  4.28  4.33    Hemoglobin 12.5  14.0  14.3    Hematocrit 38.0  41.0  41.5    MCV 94.3  95.8  95.8    MCH 31.0  32.7  33.0    MCHC 32.9  34.1  34.5    RDW 13.0  12.6  11.9    Platelets 214  252  249            Assessment and Plan    Diagnoses and all orders for this visit:    1. Ulcerative pancolitis without complication (Primary)  -     Comprehensive Metabolic Panel  -     Sedimentation Rate  -     C-reactive Protein  -     CBC (No Diff)  -     Vitamin B12  -     Vitamin D 25 Hydroxy       Continue Rinvoq 30 mg daily  Check updated CBC, CMP, sed rate, CRP, vitamin D, B12  Recommend screening colonoscopy in June 2025.  Will plan to schedule this at his 6-month follow-up  Encouraged annual vaccinations    Follow Up   Return in about 6 months (around 6/18/2025) for Dr. Carrera or Tereza Pickett PA-C.    Dragon dictation used throughout this note.            Tereza Pickett PA-C   [facility]  [address] [place] [address]  Office: [phone]

## 2024-12-19 ENCOUNTER — SPECIALTY PHARMACY (OUTPATIENT)
Dept: GASTROENTEROLOGY | Facility: CLINIC | Age: 34
End: 2024-12-19
Payer: COMMERCIAL

## 2024-12-19 NOTE — PROGRESS NOTES
Specialty Pharmacy Patient Management Program  Gastroenterology Reassessment     Magdy Guerrero is a 34 y.o. male seen by a Gastroenterology provider for Ulcerative Colitis and enrolled in the Patient Management program offered by [facility]. A follow-up outreach was conducted, including assessment of continued therapy appropriateness, medication adherence, and side effect incidence and management for Rinvoq (upadacitinib).     Patient seen in clinic 24 for follow-up visit.    Changes to Insurance Coverage or Financial Support  none    Relevant Past Medical History and Comorbidities  Relevant medical history and concomitant health conditions were discussed with the patient. The patient's chart has been reviewed for relevant past medical history and comorbid health conditions and updated as necessary.   Past Medical History:   Diagnosis Date    History of iron deficiency anemia     Ulcerative colitis 2023     Social History     Socioeconomic History    Marital status: Single   Tobacco Use    Smoking status: Former     Current packs/day: 0.00     Average packs/day: 0.5 packs/day for 1 year (0.5 ttl pk-yrs)     Types: Cigarettes     Start date: 2019     Quit date: 2020     Years since quittin.9     Passive exposure: Past    Smokeless tobacco: Never   Vaping Use    Vaping status: Never Used   Substance and Sexual Activity    Alcohol use: Never    Drug use: Yes     Types: Marijuana     Comment: once a day    Sexual activity: Defer     Problem list reviewed by Erin Hilton, Ton on 2024 at  3:24 PM    Allergies  Known allergies and reactions were discussed with the patient. The patient's chart has been reviewed for allergy information and updated as necessary.   Patient has no known allergies.  Allergies reviewed by Erin Hilton, Ton on 2024 at  3:24 PM    Relevant Laboratory Values  Lab Results   Component Value Date    GLUCOSE 96 2024    BUN  15 12/18/2024    CREATININE 0.75 (L) 12/18/2024    BCR 20.0 12/18/2024     12/18/2024    K 4.4 12/18/2024     12/18/2024    CO2 24.4 12/18/2024    CALCIUM 9.9 12/18/2024    PROTEINTOT 6.3 07/21/2023    ALBUMIN 4.5 12/18/2024    ALT 22 12/18/2024    AST 23 12/18/2024    ALKPHOS 107 12/18/2024    BILITOT <0.2 12/18/2024    ANIONGAP 9.0 07/21/2023    PHOS 4.2 07/02/2023    MG 2.0 07/02/2023    EGFRRESULT 123.0 12/18/2024     Lab Results   Component Value Date    WBC 6.71 12/18/2024    HGB 14.5 12/18/2024    HCT 42.3 12/18/2024     12/18/2024     Lab Results   Component Value Date    HAV Positive (A) 07/27/2023    HEPAIGM Non-Reactive 06/27/2023    HEPBIGMCORE Non-Reactive 06/27/2023    HEPBSAB Reactive 07/27/2023    HEPBSAG Non-Reactive 01/22/2024    HEPCVIRUSABY Non-Reactive 06/27/2023     Lab Results   Component Value Date    QUANTITBGLDP Negative 01/22/2024    QUANTITBGLDP Negative 07/27/2023     Lab Value Review  The above lab values have been reviewed; the following specialty medication(s) dose adjustment(s) are recommended: none.    Current Medication List  This medication list has been reviewed with the patient and evaluated for any interactions or necessary modifications/recommendations, and updated to include all prescription medications, OTC medications, and supplements the patient is currently taking. This list reflects what is contained in the patient's profile, which has also been marked as reviewed to communicate to other providers it is the most up to date version of the patient's current medication therapy.     Current Outpatient Medications:     omeprazole (priLOSEC) 40 MG capsule, Take 1 capsule by mouth Daily., Disp: 90 capsule, Rfl: 1    Upadacitinib ER (Rinvoq) 30 MG tablet sustained-release 24 hour, Take 1 tablet by mouth Daily., Disp: 30 tablet, Rfl: 10  Medicines reviewed by Erin Hilton, PharmD on 12/19/2024 at  3:24 PM    Drug Interactions  none     Adverse Drug  Reactions  Adverse Reactions Experienced:  acne - resolved with steroid completion  Plan for ADR Management: N/A     Hospitalizations and Urgent Care Since Last Assessment  Hospitalizations or Admissions: none  ED Visits: none  Urgent Office Visits: none     Adherence and Self-Administration  Approximate Number of Doses Missed Since Last Assessment: none  Ongoing or New Barriers to Patient Adherence and/or Self-Administration: none   Methods for Supporting Patient Adherence and/or Self-Administration: N/A     Recently Close Medication Therapy Problems  No medication therapy recommendations to display    Goals of Therapy  Goals related to the patient's specialty therapy were discussed with the patient. The Patient Goals segment of this outreach has been reviewed and updated.   Goals Addressed Today        Specialty Pharmacy General Goal      At least 50% symptom reduction and mucosal healing     12/18/24: plan for updated colonoscopy in June 2025; >50% symptom reduction and no longer on steroids. No abdominal pain, melena, hematochezia and acne resolved since being off steroids. Weight is stable.              Quality of Life Assessment   Quality of Life related to the patient's specialty therapy was discussed with the patient. The QOL segment of this outreach has been reviewed and updated.   Quality of Life Assessment  Quality of Life Improvement Scale: 9-A good deal better    Reassessment Plan & Follow-Up  Medication Therapy Changes: none  Additional Plans, Therapy Recommendations, or Therapy Problems to Be Addressed: none   Pharmacist to perform regular reassessments no more than (6) months from the previous assessment.  Care Coordinator to set up future refill outreaches, coordinate prescription delivery, and escalate clinical questions to pharmacist.     Attestation  Therapeutic appropriateness: Appropriate   I attest the patient was actively involved in and has agreed to the above plan of care. I attest that the  specialty medication(s) addressed above are appropriate for the patient based on my reassessment. If the prescribed therapy is at any point deemed not appropriate based on the current or future assessments, a consultation will be initiated with the patient's specialty care provider to determine the best course of action. The revised plan of therapy will be documented along with any required assessments and/or additional patient education provided.     Erin Hilton, PharmD, BCACP, BCPS, BCCCP  Clinical Specialty Pharmacist, Gastroenterology  12/19/24 15:27 EST

## 2025-01-03 ENCOUNTER — SPECIALTY PHARMACY (OUTPATIENT)
Dept: GASTROENTEROLOGY | Facility: CLINIC | Age: 35
End: 2025-01-03
Payer: COMMERCIAL

## 2025-01-03 NOTE — PROGRESS NOTES
Specialty Pharmacy Patient Management Program  Gastroenterology Refill Outreach      Magdy is a 34 y.o. male contacted today regarding refills of his medication(s).    Specialty medication(s) and dose(s) confirmed: rinvoq    Refill Questions      Flowsheet Row Most Recent Value   Changes to allergies? No   Changes to medications? No   New conditions or infections since last clinic visit No   Unplanned office visit, urgent care, ED, or hospital admission in the last 4 weeks  No   How does patient/caregiver feel medication is working? Very good   Financial problems or insurance changes  No   Since the previous refill, were any specialty medication doses or scheduled injections missed or delayed?  No   Does this patient require a clinical escalation to a pharmacist? No          Delivery Questions      Flowsheet Row Most Recent Value   Delivery method UPS   Delivery address verified with patient/caregiver? Yes   Delivery address Home   Number of medications in delivery 1   Medication(s) being filled and delivered Upadacitinib (Rinvoq)   Doses left of specialty medications 1 week   Copay verified? Yes   Copay amount 0   Copay form of payment No copayment ($0)   Ship Date 1/6   Delivery Date 1/7   Signature Required Yes            Follow-Up: 21 days    Ela Quiroga  1/3/2025  16:04 EST

## 2025-01-31 DIAGNOSIS — K51.011 ULCERATIVE PANCOLITIS WITH RECTAL BLEEDING: ICD-10-CM

## 2025-01-31 RX ORDER — UPADACITINIB 30 MG/1
30 TABLET, EXTENDED RELEASE ORAL DAILY
Qty: 30 TABLET | Refills: 11 | Status: SHIPPED | OUTPATIENT
Start: 2025-01-31

## 2025-02-01 NOTE — TELEPHONE ENCOUNTER
[facility] PULMONARY,CRITICAL CARE & SLEEP   Hitracy Morocho MD/Choco BEJARANO AGACNP-BC, NP-C      Janet BEJARANO NP-C    Hemanth BEJARANO NP-C                                         Pulmonary Progress Note    Patient - Hemanth Barrera   Age - 89 y.o.   - 1933  MRN - [id number]  Acct # - [id number]  Date of Admission - 2025  9:06 AM    Consulting Service/Physician:       Primary Care Physician: Neftaly Contreras MD    SUBJECTIVE:     Chief Complaint:   Chief Complaint   Patient presents with    Fall    Rib Pain (injury)     Subjective:    He continues to do fairly well, he was transition to progressive unit today.  He does still have some discomfort to that left side.  He remains on room air.  He has not had any desaturations.  Left upper extremity continues to improve as far as swelling.  He continues on heparin infusion, hemoglobin, slowly improving    VITALS  BP 99/70   Pulse 69   Temp 98.4 °F (36.9 °C) (Oral)   Resp 18   Ht 1.854 m (6' 1\")   Wt 74 kg (163 lb 2.3 oz)   SpO2 100%   BMI 21.52 kg/m²   Wt Readings from Last 3 Encounters:   25 74 kg (163 lb 2.3 oz)   25 73.1 kg (161 lb 2.5 oz)   25 63.5 kg (140 lb)     I/O (24 Hours)    Intake/Output Summary (Last 24 hours) at 2025 1324  Last data filed at 2025 0400  Gross per 24 hour   Intake 480 ml   Output 1700 ml   Net -1220 ml     Ventilator:      Exam:   Physical Exam   Constitutional: Elderly thin male, lying in bed, no apparent distress  HENT: Unremarkable,  Head: Normocephalic and atraumatic.   Eyes: EOM are normal. Pupils are equal, round, and reactive to light.   Neck: Neck supple.   Cardiovascular:  Regular rate and rhythm.  Normal heart tones.  No JVD.    Pulmonary/Chest: Lung sounds fairly clear, not in any distress on room air, pulse ox 96%  Abdominal: Soft. Bowel sounds are present  Musculoskeletal: Some generalized weakness but moves all  Tried to call patient with .  The left message to call back tomorrow.  I will also send a MyChart message.  Would like to know what dose of steroids he is on.  I agree with Erin-if he is doing poorly he might need to go to the ER.

## 2025-02-03 ENCOUNTER — SPECIALTY PHARMACY (OUTPATIENT)
Dept: GASTROENTEROLOGY | Facility: CLINIC | Age: 35
End: 2025-02-03
Payer: COMMERCIAL

## 2025-02-03 NOTE — PROGRESS NOTES
Specialty Pharmacy Patient Management Program  Gastroenterology Refill Outreach      Magdy is a 34 y.o. male contacted today regarding refills of his medication(s).    Specialty medication(s) and dose(s) confirmed: rinvoq    Refill Questions      Flowsheet Row Most Recent Value   Changes to allergies? No   Changes to medications? No   New conditions or infections since last clinic visit No   Unplanned office visit, urgent care, ED, or hospital admission in the last 4 weeks  No   How does patient/caregiver feel medication is working? Very good   Financial problems or insurance changes  No   Since the previous refill, were any specialty medication doses or scheduled injections missed or delayed?  No   Does this patient require a clinical escalation to a pharmacist? No          Delivery Questions      Flowsheet Row Most Recent Value   Delivery method UPS   Delivery address verified with patient/caregiver? Yes   Delivery address Home   Number of medications in delivery 1   Medication(s) being filled and delivered Upadacitinib (Rinvoq)   Doses left of specialty medications 1 week   Copay verified? Yes   Copay amount 0   Copay form of payment No copayment ($0)   Ship Date 2/4   Delivery Date Selection 02/05/25   Signature Required Yes            Follow-Up: 85 days    Ela Quiroga  2/3/2025  09:50 EST

## 2025-02-17 ENCOUNTER — LAB (OUTPATIENT)
Dept: OTHER | Facility: HOSPITAL | Age: 35
End: 2025-02-17
Payer: COMMERCIAL

## 2025-02-17 DIAGNOSIS — D50.0 IRON DEFICIENCY ANEMIA DUE TO CHRONIC BLOOD LOSS: ICD-10-CM

## 2025-02-17 LAB
BASOPHILS # BLD AUTO: 0.01 10*3/MM3 (ref 0–0.2)
BASOPHILS NFR BLD AUTO: 0.2 % (ref 0–1.5)
DEPRECATED RDW RBC AUTO: 43.3 FL (ref 37–54)
EOSINOPHIL # BLD AUTO: 0.09 10*3/MM3 (ref 0–0.4)
EOSINOPHIL NFR BLD AUTO: 1.9 % (ref 0.3–6.2)
ERYTHROCYTE [DISTWIDTH] IN BLOOD BY AUTOMATED COUNT: 12.2 % (ref 12.3–15.4)
FERRITIN SERPL-MCNC: 114.6 NG/ML (ref 30–400)
HCT VFR BLD AUTO: 42.1 % (ref 37.5–51)
HGB BLD-MCNC: 14.3 G/DL (ref 13–17.7)
HGB RETIC QN AUTO: 35.2 PG (ref 29.8–36.1)
IMM GRANULOCYTES # BLD AUTO: 0.02 10*3/MM3 (ref 0–0.05)
IMM GRANULOCYTES NFR BLD AUTO: 0.4 % (ref 0–0.5)
IMM RETICS NFR: 9.7 % (ref 3–15.8)
IRON 24H UR-MRATE: 103 MCG/DL (ref 59–158)
IRON SATN MFR SERPL: 25 % (ref 20–50)
LYMPHOCYTES # BLD AUTO: 2.3 10*3/MM3 (ref 0.7–3.1)
LYMPHOCYTES NFR BLD AUTO: 49.6 % (ref 19.6–45.3)
MCH RBC QN AUTO: 32.6 PG (ref 26.6–33)
MCHC RBC AUTO-ENTMCNC: 34 G/DL (ref 31.5–35.7)
MCV RBC AUTO: 96.1 FL (ref 79–97)
MONOCYTES # BLD AUTO: 0.44 10*3/MM3 (ref 0.1–0.9)
MONOCYTES NFR BLD AUTO: 9.5 % (ref 5–12)
NEUTROPHILS NFR BLD AUTO: 1.78 10*3/MM3 (ref 1.7–7)
NEUTROPHILS NFR BLD AUTO: 38.4 % (ref 42.7–76)
NRBC BLD AUTO-RTO: 0 /100 WBC (ref 0–0.2)
PLATELET # BLD AUTO: 212 10*3/MM3 (ref 140–450)
PMV BLD AUTO: 10.1 FL (ref 6–12)
RBC # BLD AUTO: 4.38 10*6/MM3 (ref 4.14–5.8)
RETICS # AUTO: 0.08 10*6/MM3 (ref 0.02–0.13)
RETICS/RBC NFR AUTO: 1.75 % (ref 0.7–1.9)
TIBC SERPL-MCNC: 416 MCG/DL (ref 298–536)
TRANSFERRIN SERPL-MCNC: 279 MG/DL (ref 200–360)
WBC NRBC COR # BLD AUTO: 4.64 10*3/MM3 (ref 3.4–10.8)

## 2025-02-17 PROCEDURE — 85046 RETICYTE/HGB CONCENTRATE: CPT | Performed by: INTERNAL MEDICINE

## 2025-02-17 PROCEDURE — 85025 COMPLETE CBC W/AUTO DIFF WBC: CPT | Performed by: INTERNAL MEDICINE

## 2025-02-17 PROCEDURE — 36415 COLL VENOUS BLD VENIPUNCTURE: CPT

## 2025-02-17 PROCEDURE — 82728 ASSAY OF FERRITIN: CPT | Performed by: INTERNAL MEDICINE

## 2025-02-17 PROCEDURE — 83540 ASSAY OF IRON: CPT | Performed by: INTERNAL MEDICINE

## 2025-02-17 PROCEDURE — 84466 ASSAY OF TRANSFERRIN: CPT | Performed by: INTERNAL MEDICINE

## 2025-02-20 ENCOUNTER — OFFICE VISIT (OUTPATIENT)
Dept: ONCOLOGY | Facility: CLINIC | Age: 35
End: 2025-02-20
Payer: COMMERCIAL

## 2025-02-20 VITALS
DIASTOLIC BLOOD PRESSURE: 72 MMHG | HEIGHT: 63 IN | BODY MASS INDEX: 26.01 KG/M2 | WEIGHT: 146.8 LBS | RESPIRATION RATE: 16 BRPM | HEART RATE: 63 BPM | TEMPERATURE: 97.9 F | SYSTOLIC BLOOD PRESSURE: 117 MMHG | OXYGEN SATURATION: 97 %

## 2025-02-20 DIAGNOSIS — D50.0 IRON DEFICIENCY ANEMIA DUE TO CHRONIC BLOOD LOSS: Primary | ICD-10-CM

## 2025-02-20 PROCEDURE — 1126F AMNT PAIN NOTED NONE PRSNT: CPT | Performed by: INTERNAL MEDICINE

## 2025-02-20 PROCEDURE — 99214 OFFICE O/P EST MOD 30 MIN: CPT | Performed by: INTERNAL MEDICINE

## 2025-05-08 ENCOUNTER — SPECIALTY PHARMACY (OUTPATIENT)
Dept: GASTROENTEROLOGY | Facility: CLINIC | Age: 35
End: 2025-05-08
Payer: COMMERCIAL

## 2025-05-08 NOTE — PROGRESS NOTES
Specialty Pharmacy Patient Management Program  Gastroenterology Refill Outreach      Magdy is a 34 y.o. male contacted today regarding refills of his medication(s).    Specialty medication(s) and dose(s) confirmed: rinvoq    Refill Questions      Flowsheet Row Most Recent Value   Changes to allergies? No   Changes to medications? No   New conditions or infections since last clinic visit No   Unplanned office visit, urgent care, ED, or hospital admission in the last 4 weeks  No   How does patient/caregiver feel medication is working? Very good   Financial problems or insurance changes  No   Since the previous refill, were any specialty medication doses or scheduled injections missed or delayed?  No   Does this patient require a clinical escalation to a pharmacist? No          Delivery Questions      Flowsheet Row Most Recent Value   Delivery method UPS   Delivery address verified with patient/caregiver? Yes   Delivery address Home   Number of medications in delivery 1   Medication(s) being filled and delivered Upadacitinib (Rinvoq)   Doses left of specialty medications 1   Copay verified? Yes   Copay amount 0   Copay form of payment No copayment ($0)   Delivery Date Selection 05/09/25   Signature Required Yes   Do you consent to receive electronic handouts?  No            Follow-Up: 85 days    Ela Quiroga  5/8/2025  10:29 EDT

## 2025-06-26 ENCOUNTER — SPECIALTY PHARMACY (OUTPATIENT)
Dept: GASTROENTEROLOGY | Facility: CLINIC | Age: 35
End: 2025-06-26
Payer: COMMERCIAL

## 2025-06-26 NOTE — PROGRESS NOTES
Rinvoq PA approval  Key: JAMES PADILLA Case ID #: [id number]  Authorization Expiration Date: 6/26/2026     Ela Quiroga  6/26/2025  15:48 EDT

## 2025-07-02 ENCOUNTER — LAB (OUTPATIENT)
Dept: OTHER | Facility: HOSPITAL | Age: 35
End: 2025-07-02
Payer: COMMERCIAL

## 2025-07-02 DIAGNOSIS — D50.0 IRON DEFICIENCY ANEMIA DUE TO CHRONIC BLOOD LOSS: ICD-10-CM

## 2025-07-02 LAB
BASOPHILS # BLD AUTO: 0.01 10*3/MM3 (ref 0–0.2)
BASOPHILS NFR BLD AUTO: 0.2 % (ref 0–1.5)
DEPRECATED RDW RBC AUTO: 42 FL (ref 37–54)
EOSINOPHIL # BLD AUTO: 0.08 10*3/MM3 (ref 0–0.4)
EOSINOPHIL NFR BLD AUTO: 1.6 % (ref 0.3–6.2)
ERYTHROCYTE [DISTWIDTH] IN BLOOD BY AUTOMATED COUNT: 12 % (ref 12.3–15.4)
FERRITIN SERPL-MCNC: 102.3 NG/ML (ref 30–400)
HCT VFR BLD AUTO: 40.4 % (ref 37.5–51)
HGB BLD-MCNC: 14 G/DL (ref 13–17.7)
HGB RETIC QN AUTO: 35.8 PG (ref 29.8–36.1)
IMM GRANULOCYTES # BLD AUTO: 0.01 10*3/MM3 (ref 0–0.05)
IMM GRANULOCYTES NFR BLD AUTO: 0.2 % (ref 0–0.5)
IMM RETICS NFR: 6.8 % (ref 3–15.8)
IRON 24H UR-MRATE: 107 MCG/DL (ref 59–158)
IRON SATN MFR SERPL: 26 % (ref 20–50)
LYMPHOCYTES # BLD AUTO: 1.71 10*3/MM3 (ref 0.7–3.1)
LYMPHOCYTES NFR BLD AUTO: 34.8 % (ref 19.6–45.3)
MCH RBC QN AUTO: 32.9 PG (ref 26.6–33)
MCHC RBC AUTO-ENTMCNC: 34.7 G/DL (ref 31.5–35.7)
MCV RBC AUTO: 94.8 FL (ref 79–97)
MONOCYTES # BLD AUTO: 0.44 10*3/MM3 (ref 0.1–0.9)
MONOCYTES NFR BLD AUTO: 9 % (ref 5–12)
NEUTROPHILS NFR BLD AUTO: 2.66 10*3/MM3 (ref 1.7–7)
NEUTROPHILS NFR BLD AUTO: 54.2 % (ref 42.7–76)
NRBC BLD AUTO-RTO: 0 /100 WBC (ref 0–0.2)
PLATELET # BLD AUTO: 198 10*3/MM3 (ref 140–450)
PMV BLD AUTO: 10.5 FL (ref 6–12)
RBC # BLD AUTO: 4.26 10*6/MM3 (ref 4.14–5.8)
RETICS # AUTO: 0.06 10*6/MM3 (ref 0.02–0.13)
RETICS/RBC NFR AUTO: 1.5 % (ref 0.7–1.9)
TIBC SERPL-MCNC: 416 MCG/DL (ref 298–536)
TRANSFERRIN SERPL-MCNC: 279 MG/DL (ref 200–360)
WBC NRBC COR # BLD AUTO: 4.91 10*3/MM3 (ref 3.4–10.8)

## 2025-07-02 PROCEDURE — 85046 RETICYTE/HGB CONCENTRATE: CPT | Performed by: INTERNAL MEDICINE

## 2025-07-02 PROCEDURE — 36415 COLL VENOUS BLD VENIPUNCTURE: CPT

## 2025-07-02 PROCEDURE — 85025 COMPLETE CBC W/AUTO DIFF WBC: CPT | Performed by: INTERNAL MEDICINE

## 2025-07-02 PROCEDURE — 83540 ASSAY OF IRON: CPT | Performed by: INTERNAL MEDICINE

## 2025-07-02 PROCEDURE — 84466 ASSAY OF TRANSFERRIN: CPT | Performed by: INTERNAL MEDICINE

## 2025-07-02 PROCEDURE — 82728 ASSAY OF FERRITIN: CPT | Performed by: INTERNAL MEDICINE

## 2025-07-03 ENCOUNTER — OFFICE VISIT (OUTPATIENT)
Dept: ONCOLOGY | Facility: CLINIC | Age: 35
End: 2025-07-03
Payer: COMMERCIAL

## 2025-07-03 VITALS
WEIGHT: 141.1 LBS | OXYGEN SATURATION: 98 % | HEIGHT: 63 IN | TEMPERATURE: 97.7 F | HEART RATE: 65 BPM | SYSTOLIC BLOOD PRESSURE: 129 MMHG | BODY MASS INDEX: 25 KG/M2 | DIASTOLIC BLOOD PRESSURE: 79 MMHG | RESPIRATION RATE: 16 BRPM

## 2025-07-03 DIAGNOSIS — K51.011 ULCERATIVE PANCOLITIS WITH RECTAL BLEEDING: ICD-10-CM

## 2025-07-03 DIAGNOSIS — D50.0 IRON DEFICIENCY ANEMIA DUE TO CHRONIC BLOOD LOSS: Primary | ICD-10-CM

## 2025-07-03 PROCEDURE — 99214 OFFICE O/P EST MOD 30 MIN: CPT | Performed by: INTERNAL MEDICINE

## 2025-07-03 PROCEDURE — 1126F AMNT PAIN NOTED NONE PRSNT: CPT | Performed by: INTERNAL MEDICINE

## 2025-07-03 RX ORDER — OMEPRAZOLE 40 MG/1
40 CAPSULE, DELAYED RELEASE ORAL DAILY
Qty: 90 CAPSULE | Refills: 1 | Status: SHIPPED | OUTPATIENT
Start: 2025-07-03

## 2025-07-03 NOTE — PROGRESS NOTES
.     REASON FOR FOLLOWUP :   Iron deficiency anemia    HISTORY OF PRESENT ILLNESS:  The patient is a 35 y.o. year old male  who is here for follow-up with the above-mentioned history.    No significant bleeding.  No new issues.  Feeling good.    Past Medical History:   Diagnosis Date    History of iron deficiency anemia     Ulcerative colitis 2023     Past Surgical History:   Procedure Laterality Date    COLONOSCOPY N/A 2023    Procedure: COLONOSCOPY TO CECUM/TI WITH COLD BIOPSIES THROUGHOUT;  Surgeon: Jovanni Carrera MD;  Location: [facility] ENDOSCOPY;  Service: Gastroenterology;  Laterality: N/A;  pre: HEMATOCHEZIA  post: PAN -COLITIS    ENDOSCOPY N/A 2023    Procedure: ESOPHAGOGASTRODUODENOSCOPY WITH BIOPSIES;  Surgeon: Jovanni Carrera MD;  Location: [facility] ENDOSCOPY;  Service: Gastroenterology;  Laterality: N/A;  pre: MELENA  post: MILD ESOPHAGITIS, GASTRITIS       MEDICATIONS    Current Outpatient Medications:     omeprazole (priLOSEC) 40 MG capsule, TAKE 1 CAPSULE BY MOUTH EVERY DAY, Disp: 90 capsule, Rfl: 1    Upadacitinib ER (Rinvoq) 30 MG tablet sustained-release 24 hour, Take 1 tablet by mouth Daily., Disp: 30 tablet, Rfl: 11    ALLERGIES:   No Known Allergies    SOCIAL HISTORY:       Social History     Socioeconomic History    Marital status: Single   Tobacco Use    Smoking status: Every Day     Current packs/day: 0.00     Average packs/day: 0.5 packs/day for 1 year (0.5 ttl pk-yrs)     Types: Cigarettes     Start date: 2019     Last attempt to quit: 2020     Years since quittin.5     Passive exposure: Current (started back to smoking daily in )    Smokeless tobacco: Never   Vaping Use    Vaping status: Never Used   Substance and Sexual Activity    Alcohol use: Never    Drug use: Yes     Types: Marijuana     Comment: once a day    Sexual activity: Defer         FAMILY HISTORY:  Family History   Problem Relation Age of Onset    Cirrhosis Mother        REVIEW  "OF SYSTEMS:  Review of Systems   Constitutional:  Negative for activity change.   HENT:  Negative for nosebleeds and trouble swallowing.    Respiratory:  Negative for shortness of breath and wheezing.    Cardiovascular:  Negative for chest pain and palpitations.   Gastrointestinal:  Negative for constipation, diarrhea and nausea.   Genitourinary:  Negative for dysuria and hematuria.   Musculoskeletal:  Negative for arthralgias and myalgias.   Neurological:  Negative for seizures and syncope.   Hematological:  Negative for adenopathy. Does not bruise/bleed easily.   Psychiatric/Behavioral:  Negative for confusion.               Vitals:    07/03/25 0937   BP: 129/79   Pulse: 65   Resp: 16   Temp: 97.7 °F (36.5 °C)   TempSrc: Oral   SpO2: 98%   Weight: 64 kg (141 lb 1.6 oz)   Height: 160 cm (62.99\")   PainSc: 0-No pain           7/3/2025     9:34 AM   Current Status   ECOG score 0      PHYSICAL EXAM:        CONSTITUTIONAL:  Vital signs reviewed.  No distress, looks comfortable.  EYES:  Conjunctiva and lids unremarkable.  PERRLA  EARS,NOSE,MOUTH,THROAT:  Ears and nose appear unremarkable.  Lips, teeth, gums appear unremarkable.  RESPIRATORY:  Normal respiratory effort.  Lungs clear to auscultation bilaterally.  CARDIOVASCULAR:  Normal S1, S2.  No murmurs rubs or gallops.  No significant lower extremity edema.  GASTROINTESTINAL: Abdomen appears unremarkable.  Nontender.  No hepatomegaly.  No splenomegaly.  LYMPHATIC:  No cervical, supraclavicular, axillary lymphadenopathy.  SKIN:  Warm.  No rashes.  PSYCHIATRIC:  Normal judgment and insight.  Normal mood and affect.       RECENT LABS:        WBC   Date Value Ref Range Status   07/02/2025 4.91 3.40 - 10.80 10*3/mm3 Final   02/17/2025 4.64 3.40 - 10.80 10*3/mm3 Final   12/18/2024 6.71 3.40 - 10.80 10*3/mm3 Final   10/16/2024 5.95 3.40 - 10.80 10*3/mm3 Final   06/11/2024 4.37 3.40 - 10.80 10*3/mm3 Final   03/20/2024 4.49 3.40 - 10.80 10*3/mm3 Final   12/01/2023 8.58 3.40 - " 10.80 10*3/mm3 Final   11/21/2023 7.25 3.40 - 10.80 10*3/mm3 Final   10/26/2023 8.20 3.40 - 10.80 10*3/mm3 Final   10/19/2023 8.6 3.4 - 10.8 x10E3/uL Final   08/18/2023 9.54 3.40 - 10.80 10*3/mm3 Final   07/27/2023 8.1 3.4 - 10.8 x10E3/uL Final   07/21/2023 5.20 3.40 - 10.80 10*3/mm3 Final   07/02/2023 9.35 3.40 - 10.80 10*3/mm3 Final   07/01/2023 7.61 3.40 - 10.80 10*3/mm3 Final   06/30/2023 6.71 3.40 - 10.80 10*3/mm3 Final   06/29/2023 5.88 3.40 - 10.80 10*3/mm3 Final   06/28/2023 7.62 3.40 - 10.80 10*3/mm3 Final   06/27/2023 8.29 3.40 - 10.80 10*3/mm3 Final     Hemoglobin   Date Value Ref Range Status   07/02/2025 14.0 13.0 - 17.7 g/dL Final   02/17/2025 14.3 13.0 - 17.7 g/dL Final   12/18/2024 14.5 13.0 - 17.7 g/dL Final   10/16/2024 14.3 13.0 - 17.7 g/dL Final   06/11/2024 14.0 13.0 - 17.7 g/dL Final   03/20/2024 12.5 (L) 13.0 - 17.7 g/dL Final   12/01/2023 12.5 (L) 13.0 - 17.7 g/dL Final   11/21/2023 12.2 (L) 13.0 - 17.7 g/dL Final   10/26/2023 8.8 (L) 13.0 - 17.7 g/dL Final   10/19/2023 8.8 (L) 13.0 - 17.7 g/dL Final   08/18/2023 12.0 (L) 13.0 - 17.7 g/dL Final   07/27/2023 13.4 13.0 - 17.7 g/dL Final   07/21/2023 13.0 13.0 - 17.7 g/dL Final   07/02/2023 12.6 (L) 13.0 - 17.7 g/dL Final   07/01/2023 12.7 (L) 13.0 - 17.7 g/dL Final   06/30/2023 11.7 (L) 13.0 - 17.7 g/dL Final   06/29/2023 11.6 (L) 13.0 - 17.7 g/dL Final   06/28/2023 12.5 (L) 13.0 - 17.7 g/dL Final   06/27/2023 14.5 13.0 - 17.7 g/dL Final     Platelets   Date Value Ref Range Status   07/02/2025 198 140 - 450 10*3/mm3 Final   02/17/2025 212 140 - 450 10*3/mm3 Final   12/18/2024 276 140 - 450 10*3/mm3 Final   10/16/2024 249 140 - 450 10*3/mm3 Final   06/11/2024 252 140 - 450 10*3/mm3 Final   03/20/2024 214 140 - 450 10*3/mm3 Final   12/01/2023 326 140 - 450 10*3/mm3 Final   11/21/2023 338 140 - 450 10*3/mm3 Final   10/26/2023 499 (H) 140 - 450 10*3/mm3 Final   10/19/2023 563 (H) 150 - 450 x10E3/uL Final   08/18/2023 393 140 - 450 10*3/mm3 Final    07/27/2023 247 150 - 450 x10E3/uL Final   07/21/2023 198 140 - 450 10*3/mm3 Final   07/02/2023 247 140 - 450 10*3/mm3 Final   07/01/2023 220 140 - 450 10*3/mm3 Final   06/30/2023 189 140 - 450 10*3/mm3 Final   06/29/2023 186 140 - 450 10*3/mm3 Final   06/28/2023 210 140 - 450 10*3/mm3 Final   06/27/2023 218 140 - 450 10*3/mm3 Final       Assessment & Plan   Iron deficiency anemia due to chronic blood loss  - CBC & Differential  - Ferritin  - Iron Profile w/o Ferritin  - Retic With IRF & RET-He    Ulcerative pancolitis with rectal bleeding  - CBC & Differential  - Ferritin  - Iron Profile w/o Ferritin  - Retic With IRF & RET-He          Magdy Guerrero   *Iron deficiency anemia  Begin ferrous sulfate twice per day 10/20/2023.  10/26/2023 Hb 8.8, unchanged from 10/19/2023.  No GI side effects yet from ferrous sulfate.  Therefore, continue this for another month or so and reassess to see if it is working.  He will call us if he becomes intolerant to oral iron in which case we will arrange IV iron.  11/21/2023: Ferritin 30, 7% saturation, Hb 12.2.  B12 459.  Serum folate >20.  12/1/2023: Hb 12.5.  Cannot tolerate oral iron due to abdominal pain.  Arrange Injectafer x 2 doses.  If insurance will not cover Injectafer then arrange Venofer 200 mg x 5 doses.    3/20/2024: Ferritin 150, 7% saturation, Hb 12.5, reticulated Hb 31.6.  Arrange Venofer 200 mg x 3 doses  6/11/2024: Ferritin 147, 26% saturation, Hb 14.  No need for IV iron currently  10/16/2024: Ferritin 139, 33% saturation, Hb 14.3.  No need for IV iron  2/17/2025: Ferritin 115, 25% saturation, Hb 14.3.  No need for IV iron  7/2/2025: Ferritin 102, 26% saturation, Hb 14.  No need for IV iron    *Possible allergy to ferrous sulfate  12/1/2023: Red raised rash bilateral arms and chest.  He states this has been present since beginning oral iron.  Premedicine's for IV iron: Pepcid 20 mg IV, Benadryl 25 mg IV, Solu-Cortef 40 mg IV, Tylenol 650 mg oral    *Source of  iron deficiency  Follows with GI for ulcerative colitis    *Ulcerative colitis    *Microcytosis, likely due to iron deficiency.  MCV 94.8    *Thrombocytosis, likely due to iron deficiency      Plan  No need for Venofer currently  Because of a red raised rash that may have been caused by oral ferrous sulfate, premedicine's for IV iron:   Pepcid 20 mg IV, Benadryl 25 mg IV, Solu-Cortef 40 mg IV, Tylenol 650 mg oral  MD 4 months.  At least 1 day prior: Ferritin, iron panel, CBC, reticulate hemoglobin  (I offered 6-month follow-up but he prefers to maintain 4-month follow-up which is fine)

## 2025-07-07 ENCOUNTER — TELEPHONE (OUTPATIENT)
Dept: GASTROENTEROLOGY | Facility: CLINIC | Age: 35
End: 2025-07-07
Payer: COMMERCIAL

## 2025-07-10 ENCOUNTER — OFFICE VISIT (OUTPATIENT)
Dept: GASTROENTEROLOGY | Facility: CLINIC | Age: 35
End: 2025-07-10
Payer: COMMERCIAL

## 2025-07-10 ENCOUNTER — TELEPHONE (OUTPATIENT)
Dept: GASTROENTEROLOGY | Facility: CLINIC | Age: 35
End: 2025-07-10
Payer: COMMERCIAL

## 2025-07-10 ENCOUNTER — SPECIALTY PHARMACY (OUTPATIENT)
Dept: GASTROENTEROLOGY | Facility: CLINIC | Age: 35
End: 2025-07-10
Payer: COMMERCIAL

## 2025-07-10 VITALS
SYSTOLIC BLOOD PRESSURE: 119 MMHG | HEIGHT: 66 IN | BODY MASS INDEX: 22.77 KG/M2 | HEART RATE: 69 BPM | TEMPERATURE: 97.7 F | DIASTOLIC BLOOD PRESSURE: 77 MMHG | WEIGHT: 141.7 LBS

## 2025-07-10 DIAGNOSIS — K51.00 ULCERATIVE PANCOLITIS WITHOUT COMPLICATION: Primary | ICD-10-CM

## 2025-07-10 PROBLEM — R11.0 NAUSEA: Status: ACTIVE | Noted: 2023-06-27

## 2025-07-10 PROBLEM — K52.9 COLITIS: Status: ACTIVE | Noted: 2023-06-27

## 2025-07-10 PROBLEM — K62.5 BRBPR (BRIGHT RED BLOOD PER RECTUM): Status: ACTIVE | Noted: 2023-06-27

## 2025-07-10 PROCEDURE — 1159F MED LIST DOCD IN RCRD: CPT | Performed by: PHYSICIAN ASSISTANT

## 2025-07-10 PROCEDURE — 99214 OFFICE O/P EST MOD 30 MIN: CPT | Performed by: PHYSICIAN ASSISTANT

## 2025-07-10 PROCEDURE — 1160F RVW MEDS BY RX/DR IN RCRD: CPT | Performed by: PHYSICIAN ASSISTANT

## 2025-07-10 NOTE — TELEPHONE ENCOUNTER
NIKKO franz  for COLONOSCOPY on 08/01  arrive at  8am  . mailed prep instructions to verified address on file....miralax OK FOR THE HUB TO RELAY

## 2025-07-10 NOTE — PROGRESS NOTES
Specialty Pharmacy Patient Management Program  Gastroenterology Reassessment     Magdy Guerrero is a 35 y.o. male seen by a Gastroenterology provider for Ulcerative Colitis and enrolled in the Patient Management program offered by [facility]. A follow-up outreach was conducted, including assessment of continued therapy appropriateness, medication adherence, and side effect incidence and management for Rinvoq (upadacitinib).     Changes to Insurance Coverage or Financial Support  none    Relevant Past Medical History and Comorbidities  Relevant medical history and concomitant health conditions were discussed with the patient. The patient's chart has been reviewed for relevant past medical history and comorbid health conditions and updated as necessary.   Past Medical History:   Diagnosis Date    History of iron deficiency anemia     Ulcerative colitis 2023     Social History     Socioeconomic History    Marital status: Single   Tobacco Use    Smoking status: Every Day     Current packs/day: 0.00     Average packs/day: 0.5 packs/day for 1 year (0.5 ttl pk-yrs)     Types: Cigarettes     Start date: 2019     Last attempt to quit: 2020     Years since quittin.5     Passive exposure: Current (started back to smoking daily in )    Smokeless tobacco: Never   Vaping Use    Vaping status: Never Used   Substance and Sexual Activity    Alcohol use: Never    Drug use: Yes     Types: Marijuana     Comment: once a day    Sexual activity: Defer          Allergies  Known allergies and reactions were discussed with the patient. The patient's chart has been reviewed for allergy information and updated as necessary.   Patient has no known allergies.  Allergies reviewed by Erin Hilton, PharmD on 7/10/2025 at 11:32 AM    Relevant Laboratory Values  Lab Results   Component Value Date    GLUCOSE 96 2024    BUN 15 2024    CREATININE 0.75 (L) 2024    BCR 20.0 2024      12/18/2024    K 4.4 12/18/2024     12/18/2024    CO2 24.4 12/18/2024    CALCIUM 9.9 12/18/2024    PROTEINTOT 7.3 12/18/2024    ALBUMIN 4.5 12/18/2024    ALT 22 12/18/2024    AST 23 12/18/2024    ALKPHOS 107 12/18/2024    BILITOT <0.2 12/18/2024    ANIONGAP 9.0 07/21/2023    PHOS 4.2 07/02/2023    MG 2.0 07/02/2023     Lab Results   Component Value Date    WBC 4.91 07/02/2025    HGB 14.0 07/02/2025    HCT 40.4 07/02/2025     07/02/2025     Lab Results   Component Value Date    HAV Positive (A) 07/27/2023    HEPAIGM Non-Reactive 06/27/2023    HEPBIGMCORE Non-Reactive 06/27/2023    HEPBSAB Reactive 07/27/2023    HEPBSAG Non-Reactive 01/22/2024    HEPCVIRUSABY Non-Reactive 06/27/2023     Lab Results   Component Value Date    QUANTITBGLDP Negative 01/22/2024    QUANTITBGLDP Negative 07/27/2023     Lab Value Review  The above lab values have been reviewed; the following specialty medication(s) dose adjustment(s) are recommended: none.    Current Medication List  This medication list has been reviewed with the patient and evaluated for any interactions or necessary modifications/recommendations, and updated to include all prescription medications, OTC medications, and supplements the patient is currently taking. This list reflects what is contained in the patient's profile, which has also been marked as reviewed to communicate to other providers it is the most up to date version of the patient's current medication therapy.     Current Outpatient Medications:     omeprazole (priLOSEC) 40 MG capsule, TAKE 1 CAPSULE BY MOUTH EVERY DAY, Disp: 90 capsule, Rfl: 1    Upadacitinib ER (Rinvoq) 30 MG tablet sustained-release 24 hour, Take 1 tablet by mouth Daily., Disp: 30 tablet, Rfl: 11  Medicines reviewed by Erin Hilton, PharmD on 7/10/2025 at 11:32 AM    Drug Interactions  none     Adverse Drug Reactions  Adverse Reactions Experienced: none  Plan for ADR Management: N/A     Hospitalizations and Urgent  Care Since Last Assessment  Hospitalizations or Admissions: none  ED Visits: none  Urgent Office Visits: none     Adherence and Self-Administration  Approximate Number of Doses Missed Since Last Assessment: none  Ongoing or New Barriers to Patient Adherence and/or Self-Administration: none   Methods for Supporting Patient Adherence and/or Self-Administration: N/A     Recently Close Medication Therapy Problems  No medication therapy recommendations to display    Goals of Therapy  Goals related to the patient's specialty therapy were discussed with the patient. The Patient Goals segment of this outreach has been reviewed and updated.   Goals Addressed Today        Specialty Pharmacy General Goal      At least 50% symptom reduction and mucosal healing     12/18/24: plan for updated colonoscopy in June 2025; >50% symptom reduction and no longer on steroids. No abdominal pain, melena, hematochezia and acne resolved since being off steroids. Weight is stable.    7/10/25: patient doing very well with minimal UC symptoms and stable weight. Colonoscopy scheduled for 8/1/25.              Quality of Life Assessment   Quality of Life related to the patient's specialty therapy was discussed with the patient. The QOL segment of this outreach has been reviewed and updated.   Quality of Life Assessment  Quality of Life Improvement Scale: 9-A good deal better    Reassessment Plan & Follow-Up  Medication Therapy Changes: none  Additional Plans, Therapy Recommendations, or Therapy Problems to Be Addressed: none; patient in office for follow-up today and doing very well - updated colonoscopy scheduled for 8/1/25.  Pharmacist to perform regular reassessments no more than (6) months from the previous assessment.  Care Coordinator to set up future refill outreaches, coordinate prescription delivery, and escalate clinical questions to pharmacist.     Attestation  Therapeutic appropriateness: Appropriate   I attest the patient was actively  involved in and has agreed to the above plan of care. I attest that the specialty medication(s) addressed above are appropriate for the patient based on my reassessment. If the prescribed therapy is at any point deemed not appropriate based on the current or future assessments, a consultation will be initiated with the patient's specialty care provider to determine the best course of action. The revised plan of therapy will be documented along with any required assessments and/or additional patient education provided.     Erin Hilton, PharmD, BCACP, BCPS, BCCCP  Clinical Specialty Pharmacist, Gastroenterology  07/10/25 11:34 EDT

## 2025-07-11 LAB
25(OH)D3+25(OH)D2 SERPL-MCNC: 28.8 NG/ML (ref 30–100)
ALBUMIN SERPL-MCNC: 4.8 G/DL (ref 4.1–5.1)
ALP SERPL-CCNC: 89 IU/L (ref 44–121)
ALT SERPL-CCNC: 39 IU/L (ref 0–44)
AST SERPL-CCNC: 43 IU/L (ref 0–40)
BASOPHILS # BLD AUTO: 0 X10E3/UL (ref 0–0.2)
BASOPHILS NFR BLD AUTO: 0 %
BILIRUB SERPL-MCNC: 0.4 MG/DL (ref 0–1.2)
BUN SERPL-MCNC: 20 MG/DL (ref 6–20)
BUN/CREAT SERPL: 12 (ref 9–20)
CALCIUM SERPL-MCNC: 9.9 MG/DL (ref 8.7–10.2)
CHLORIDE SERPL-SCNC: 104 MMOL/L (ref 96–106)
CO2 SERPL-SCNC: 20 MMOL/L (ref 20–29)
CREAT SERPL-MCNC: 1.71 MG/DL (ref 0.76–1.27)
CRP SERPL-MCNC: <1 MG/L (ref 0–10)
EGFRCR SERPLBLD CKD-EPI 2021: 54 ML/MIN/1.73
EOSINOPHIL # BLD AUTO: 0.1 X10E3/UL (ref 0–0.4)
EOSINOPHIL NFR BLD AUTO: 2 %
ERYTHROCYTE [DISTWIDTH] IN BLOOD BY AUTOMATED COUNT: 12.6 % (ref 11.6–15.4)
GLOBULIN SER CALC-MCNC: 2.1 G/DL (ref 1.5–4.5)
GLUCOSE SERPL-MCNC: 96 MG/DL (ref 70–99)
HBV SURFACE AG SERPL QL IA: NEGATIVE
HCT VFR BLD AUTO: 44 % (ref 37.5–51)
HGB BLD-MCNC: 14.2 G/DL (ref 13–17.7)
IMM GRANULOCYTES # BLD AUTO: 0 X10E3/UL (ref 0–0.1)
IMM GRANULOCYTES NFR BLD AUTO: 0 %
LYMPHOCYTES # BLD AUTO: 1.7 X10E3/UL (ref 0.7–3.1)
LYMPHOCYTES NFR BLD AUTO: 37 %
MCH RBC QN AUTO: 32.1 PG (ref 26.6–33)
MCHC RBC AUTO-ENTMCNC: 32.3 G/DL (ref 31.5–35.7)
MCV RBC AUTO: 99 FL (ref 79–97)
MONOCYTES # BLD AUTO: 0.5 X10E3/UL (ref 0.1–0.9)
MONOCYTES NFR BLD AUTO: 10 %
NEUTROPHILS # BLD AUTO: 2.5 X10E3/UL (ref 1.4–7)
NEUTROPHILS NFR BLD AUTO: 51 %
PLATELET # BLD AUTO: 204 X10E3/UL (ref 150–450)
POTASSIUM SERPL-SCNC: 4.3 MMOL/L (ref 3.5–5.2)
PROT SERPL-MCNC: 6.9 G/DL (ref 6–8.5)
RBC # BLD AUTO: 4.43 X10E6/UL (ref 4.14–5.8)
SODIUM SERPL-SCNC: 139 MMOL/L (ref 134–144)
VIT B12 SERPL-MCNC: 1154 PG/ML (ref 232–1245)
WBC # BLD AUTO: 4.7 X10E3/UL (ref 3.4–10.8)

## 2025-07-14 LAB
GAMMA INTERFERON BACKGROUND BLD IA-ACNC: 0.09 IU/ML
M TB IFN-G BLD-IMP: NEGATIVE
M TB IFN-G CD4+ BCKGRND COR BLD-ACNC: 0.09 IU/ML
M TB IFN-G CD4+CD8+ BCKGRND COR BLD-ACNC: 0.09 IU/ML
MITOGEN IGNF BCKGRD COR BLD-ACNC: >10 IU/ML
QUANTIFERON INCUBATION: NORMAL
SERVICE CMNT-IMP: NORMAL

## 2025-07-16 ENCOUNTER — TELEPHONE (OUTPATIENT)
Dept: GASTROENTEROLOGY | Facility: CLINIC | Age: 35
End: 2025-07-16
Payer: COMMERCIAL

## 2025-07-16 DIAGNOSIS — R89.9 ABNORMAL LABORATORY TEST: Primary | ICD-10-CM

## 2025-07-16 NOTE — TELEPHONE ENCOUNTER
Hub staff attempted to follow warm transfer process and was unsuccessful     Caller: Magdy Guerrero    Relationship to patient: Self    Best call back number: [phone]     Patient is needing: PATIENT IS RETURNING A CALL REGARDING LAB RESULTS.  PLEASE CALL BACK.    *CALL COMPLETED USING  #[phone].

## 2025-08-01 ENCOUNTER — ANESTHESIA (OUTPATIENT)
Dept: GASTROENTEROLOGY | Facility: HOSPITAL | Age: 35
End: 2025-08-01
Payer: COMMERCIAL

## 2025-08-01 ENCOUNTER — HOSPITAL ENCOUNTER (OUTPATIENT)
Facility: HOSPITAL | Age: 35
Setting detail: HOSPITAL OUTPATIENT SURGERY
Discharge: HOME | End: 2025-08-01
Attending: INTERNAL MEDICINE | Admitting: INTERNAL MEDICINE
Payer: COMMERCIAL

## 2025-08-01 ENCOUNTER — ANESTHESIA EVENT (OUTPATIENT)
Dept: GASTROENTEROLOGY | Facility: HOSPITAL | Age: 35
End: 2025-08-01
Payer: COMMERCIAL

## 2025-08-01 VITALS
OXYGEN SATURATION: 100 % | WEIGHT: 139.6 LBS | SYSTOLIC BLOOD PRESSURE: 101 MMHG | BODY MASS INDEX: 25.69 KG/M2 | HEIGHT: 62 IN | DIASTOLIC BLOOD PRESSURE: 61 MMHG | HEART RATE: 69 BPM | RESPIRATION RATE: 20 BRPM

## 2025-08-01 DIAGNOSIS — K62.5 BRBPR (BRIGHT RED BLOOD PER RECTUM): ICD-10-CM

## 2025-08-01 DIAGNOSIS — R11.0 NAUSEA: ICD-10-CM

## 2025-08-01 DIAGNOSIS — K52.9 COLITIS: ICD-10-CM

## 2025-08-01 PROCEDURE — 43239 EGD BIOPSY SINGLE/MULTIPLE: CPT | Performed by: INTERNAL MEDICINE

## 2025-08-01 PROCEDURE — 45380 COLONOSCOPY AND BIOPSY: CPT | Performed by: INTERNAL MEDICINE

## 2025-08-01 PROCEDURE — 25010000002 LIDOCAINE 2% SOLUTION: Performed by: NURSE ANESTHETIST, CERTIFIED REGISTERED

## 2025-08-01 PROCEDURE — 25810000003 LACTATED RINGERS PER 1000 ML: Performed by: INTERNAL MEDICINE

## 2025-08-01 PROCEDURE — 25010000002 PHENYLEPHRINE 10 MG/ML SOLUTION 5 ML VIAL: Performed by: NURSE ANESTHETIST, CERTIFIED REGISTERED

## 2025-08-01 PROCEDURE — 25010000002 PROPOFOL 10 MG/ML EMULSION: Performed by: NURSE ANESTHETIST, CERTIFIED REGISTERED

## 2025-08-01 PROCEDURE — 88305 TISSUE EXAM BY PATHOLOGIST: CPT | Performed by: INTERNAL MEDICINE

## 2025-08-01 PROCEDURE — 25810000003 SODIUM CHLORIDE 0.9 % SOLUTION 250 ML FLEX CONT: Performed by: NURSE ANESTHETIST, CERTIFIED REGISTERED

## 2025-08-01 PROCEDURE — S0260 H&P FOR SURGERY: HCPCS | Performed by: INTERNAL MEDICINE

## 2025-08-01 PROCEDURE — 25010000002 GLYCOPYRROLATE 0.2 MG/ML SOLUTION: Performed by: NURSE ANESTHETIST, CERTIFIED REGISTERED

## 2025-08-01 RX ORDER — PROPOFOL 10 MG/ML
VIAL (ML) INTRAVENOUS AS NEEDED
Status: DISCONTINUED | OUTPATIENT
Start: 2025-08-01 | End: 2025-08-01 | Stop reason: SURG

## 2025-08-01 RX ORDER — SODIUM CHLORIDE, SODIUM LACTATE, POTASSIUM CHLORIDE, CALCIUM CHLORIDE 600; 310; 30; 20 MG/100ML; MG/100ML; MG/100ML; MG/100ML
30 INJECTION, SOLUTION INTRAVENOUS CONTINUOUS PRN
Status: DISCONTINUED | OUTPATIENT
Start: 2025-08-01 | End: 2025-08-01 | Stop reason: HOSPADM

## 2025-08-01 RX ORDER — GLYCOPYRROLATE 0.2 MG/ML
INJECTION INTRAMUSCULAR; INTRAVENOUS AS NEEDED
Status: DISCONTINUED | OUTPATIENT
Start: 2025-08-01 | End: 2025-08-01 | Stop reason: SURG

## 2025-08-01 RX ORDER — LIDOCAINE HYDROCHLORIDE 20 MG/ML
INJECTION, SOLUTION INFILTRATION; PERINEURAL AS NEEDED
Status: DISCONTINUED | OUTPATIENT
Start: 2025-08-01 | End: 2025-08-01 | Stop reason: SURG

## 2025-08-01 RX ADMIN — SODIUM CHLORIDE, POTASSIUM CHLORIDE, SODIUM LACTATE AND CALCIUM CHLORIDE 30 ML/HR: 600; 310; 30; 20 INJECTION, SOLUTION INTRAVENOUS at 09:01

## 2025-08-01 RX ADMIN — PHENYLEPHRINE HYDROCHLORIDE 100 MCG: 10 INJECTION, SOLUTION INTRAVENOUS at 09:55

## 2025-08-01 RX ADMIN — GLYCOPYRROLATE 0.2 MG: 0.2 INJECTION INTRAMUSCULAR; INTRAVENOUS at 09:29

## 2025-08-01 RX ADMIN — PROPOFOL 200 MCG/KG/MIN: 10 INJECTION, EMULSION INTRAVENOUS at 09:29

## 2025-08-01 RX ADMIN — PROPOFOL 130 MG: 10 INJECTION, EMULSION INTRAVENOUS at 09:28

## 2025-08-01 RX ADMIN — LIDOCAINE HYDROCHLORIDE 100 MG: 20 INJECTION, SOLUTION INFILTRATION; PERINEURAL at 09:29

## 2025-08-01 NOTE — H&P
[facility], [place], operated by [facility]  Pre Procedure History & Physical    Chief Complaint:   History of iron deficiency anemia, ulcerative colitis    Subjective     HPI:   This 35-year-old male presents the endoscopy suite for upper and lower endoscopic evaluations.  He has a history of iron deficiency anemia and prior history of nausea with H. pylori.  He also has underlying history of ulcerative pancolitis.  Last upper and lower endoscopies performed in 2023.    Past Medical History:   Past Medical History:   Diagnosis Date    History of iron deficiency anemia     Ulcerative colitis 07/02/2023       Past Surgical History:  Past Surgical History:   Procedure Laterality Date    COLONOSCOPY N/A 06/29/2023    Procedure: COLONOSCOPY TO CECUM/TI WITH COLD BIOPSIES THROUGHOUT;  Surgeon: Jovanni Carrera MD;  Location: [facility] ENDOSCOPY;  Service: Gastroenterology;  Laterality: N/A;  pre: HEMATOCHEZIA  post: PAN -COLITIS    ENDOSCOPY N/A 06/29/2023    Procedure: ESOPHAGOGASTRODUODENOSCOPY WITH BIOPSIES;  Surgeon: Jovanni Carrera MD;  Location: [facility] ENDOSCOPY;  Service: Gastroenterology;  Laterality: N/A;  pre: MELENA  post: MILD ESOPHAGITIS, GASTRITIS       Family History:  Family History   Problem Relation Age of Onset    Cirrhosis Mother        Social History:   reports that he has been smoking cigarettes. He started smoking about 6 years ago. He has a 0.5 pack-year smoking history. He has been exposed to tobacco smoke. He has never used smokeless tobacco. He reports current drug use. Drug: Marijuana. He reports that he does not drink alcohol.    Medications:   No medications prior to admission.       Allergies:  Patient has no known allergies.    ROS:    Pertinent items are noted in HPI     Objective     There were no vitals taken for this visit.    Physical Exam   Constitutional: Pt is oriented to person, place, and time and well-developed, well-nourished, and in no distress.   Mouth/Throat: Oropharynx is  clear and moist.   Neck: Normal range of motion.   Cardiovascular: Normal rate, regular rhythm and normal heart sounds.    Pulmonary/Chest: Effort normal and breath sounds normal.   Abdominal: Soft. Nontender  Skin: Skin is warm and dry.   Psychiatric: Mood, memory, affect and judgment normal.     Assessment & Plan     Diagnosis:  History of iron deficiency anemia   History of H. Pylori  Ulcerative pancolitis    Anticipated Surgical Procedure:  EGD, colonoscopy    The risks, benefits, and alternatives of this procedure have been discussed with the patient or the responsible party- the patient understands and agrees to proceed.

## 2025-08-01 NOTE — DISCHARGE INSTRUCTIONS
For THE REST OF TODAY DO NOT drive, operate machinery, appliances, drink alcohol, make important decisions or sign legal documents.    Start with a light or bland diet if you are feeling sick to your stomach otherwise advance to regular diet as tolerated.    Follow recommendations on procedure report if provided by your doctor.    Call Dr Carrera for problems .    Problems may include but not limited to: large amounts of bleeding, trouble breathing, repeated vomiting, severe unrelieved pain, fever or chills.      If biopsies or polyps were taken, MD will call you with the results in about 7 days. If you don't hear from the MD in 2 weeks, call the number above.

## 2025-08-01 NOTE — ANESTHESIA PREPROCEDURE EVALUATION
Anesthesia Evaluation     Patient summary reviewed and Nursing notes reviewed   NPO Solid Status: > 8 hours  NPO Liquid Status: > 4 hours           Airway   Mallampati: II  Neck ROM: full  No difficulty expected  Dental - normal exam     Pulmonary     breath sounds clear to auscultation  (+) a smoker Current,  Cardiovascular     Rhythm: regular        Neuro/Psych  GI/Hepatic/Renal/Endo    (+) GI bleeding     ROS Comment: UC    Musculoskeletal     Abdominal    Substance History   (+) drug use (marijuana)     OB/GYN          Other                    Anesthesia Plan    ASA 2     MAC     intravenous induction     Anesthetic plan, risks, benefits, and alternatives have been provided, discussed and informed consent has been obtained with: patient and spouse/significant other.    CODE STATUS:          Strong peripheral pulses

## 2025-08-01 NOTE — ANESTHESIA POSTPROCEDURE EVALUATION
"Patient: Magdy Guerrero    Procedure Summary       Date: 08/01/25 Room / Location:  [facility]    Anesthesia Start: 0923 Anesthesia Stop: 1006    Procedures:       COLONOSCOPY to the cecum  and TI with biopsies      ESOPHAGOGASTRODUODENOSCOPY with biopsies (Esophagus) Diagnosis:       Esophagitis      Gastritis      Pancolitis      (Colitis [K52.9])      (BRBPR (bright red blood per rectum) [K62.5])      (Nausea [R11.0])    Surgeons: Jovanni Carrera MD Provider: Tyree Castillo MD    Anesthesia Type: MAC ASA Status: 2            Anesthesia Type: MAC    Vitals  Vitals Value Taken Time   BP 95/52 08/01/25 10:31   Temp     Pulse 54 08/01/25 10:31   Resp 20 08/01/25 10:20   SpO2 100 % 08/01/25 10:31   Vitals shown include unfiled device data.        Post Anesthesia Care and Evaluation    Patient location during evaluation: bedside  Patient participation: complete - patient participated  Level of consciousness: sleepy but conscious  Pain score: 0  Pain management: adequate    Airway patency: patent  Anesthetic complications: No anesthetic complications    Cardiovascular status: acceptable  Respiratory status: acceptable  Hydration status: acceptable    Comments: /61 (BP Location: Left arm, Patient Position: Lying)   Pulse 69   Resp 20   Ht 157.5 cm (62\")   Wt 63.3 kg (139 lb 9.6 oz)   SpO2 100%   BMI 25.53 kg/m²       "

## 2025-08-04 ENCOUNTER — SPECIALTY PHARMACY (OUTPATIENT)
Dept: GASTROENTEROLOGY | Facility: CLINIC | Age: 35
End: 2025-08-04
Payer: COMMERCIAL

## 2025-08-04 LAB
CYTO UR: NORMAL
LAB AP CASE REPORT: NORMAL
PATH REPORT.FINAL DX SPEC: NORMAL
PATH REPORT.GROSS SPEC: NORMAL

## 2025-08-15 ENCOUNTER — LAB (OUTPATIENT)
Dept: GASTROENTEROLOGY | Facility: CLINIC | Age: 35
End: 2025-08-15
Payer: COMMERCIAL

## (undated) DEVICE — MSK PROC CURAPLEX O2 2/ADAPT 7FT

## (undated) DEVICE — TUBING, SUCTION, 1/4" X 10', STRAIGHT: Brand: MEDLINE

## (undated) DEVICE — SENSR O2 OXIMAX FNGR A/ 18IN NONSTR

## (undated) DEVICE — SINGLE-USE BIOPSY FORCEPS: Brand: RADIAL JAW 4

## (undated) DEVICE — KT ORCA ORCAPOD DISP STRL

## (undated) DEVICE — LN SMPL CO2 SHTRM SD STREAM W/M LUER

## (undated) DEVICE — BLCK/BITE BLOX W/DENTL/RIM W/STRAP 54F

## (undated) DEVICE — LASSO POLYPECTOMY SNARE: Brand: LASSO

## (undated) DEVICE — ADAPT CLN BIOGUARD AIR/H2O DISP